# Patient Record
Sex: FEMALE | Race: BLACK OR AFRICAN AMERICAN | NOT HISPANIC OR LATINO | Employment: FULL TIME | URBAN - METROPOLITAN AREA
[De-identification: names, ages, dates, MRNs, and addresses within clinical notes are randomized per-mention and may not be internally consistent; named-entity substitution may affect disease eponyms.]

---

## 2018-12-20 ENCOUNTER — HOSPITAL ENCOUNTER (INPATIENT)
Facility: HOSPITAL | Age: 48
LOS: 2 days | Discharge: HOME | DRG: 066 | End: 2018-12-22
Attending: EMERGENCY MEDICINE | Admitting: PSYCHIATRY & NEUROLOGY
Payer: MEDICAID

## 2018-12-20 DIAGNOSIS — R47.81 SLURRED SPEECH: ICD-10-CM

## 2018-12-20 DIAGNOSIS — I63.511 ACUTE ISCHEMIC RIGHT MCA STROKE: ICD-10-CM

## 2018-12-20 DIAGNOSIS — I63.411 EMBOLIC STROKE INVOLVING RIGHT MIDDLE CEREBRAL ARTERY: Primary | ICD-10-CM

## 2018-12-20 DIAGNOSIS — I63.9 CEREBROVASCULAR ACCIDENT (CVA), UNSPECIFIED MECHANISM: ICD-10-CM

## 2018-12-20 LAB
ANION GAP SERPL CALC-SCNC: 9 MMOL/L
BASOPHILS # BLD AUTO: 0.04 K/UL
BASOPHILS NFR BLD: 0.4 %
BUN SERPL-MCNC: 23 MG/DL
CALCIUM SERPL-MCNC: 9.8 MG/DL
CHLORIDE SERPL-SCNC: 106 MMOL/L
CO2 SERPL-SCNC: 25 MMOL/L
CREAT SERPL-MCNC: 1 MG/DL
DIFFERENTIAL METHOD: NORMAL
EOSINOPHIL # BLD AUTO: 0.1 K/UL
EOSINOPHIL NFR BLD: 0.6 %
ERYTHROCYTE [DISTWIDTH] IN BLOOD BY AUTOMATED COUNT: 12.6 %
EST. GFR  (AFRICAN AMERICAN): >60 ML/MIN/1.73 M^2
EST. GFR  (NON AFRICAN AMERICAN): >60 ML/MIN/1.73 M^2
GLUCOSE SERPL-MCNC: 102 MG/DL
HCT VFR BLD AUTO: 41 %
HGB BLD-MCNC: 13.5 G/DL
IMM GRANULOCYTES # BLD AUTO: 0.03 K/UL
IMM GRANULOCYTES NFR BLD AUTO: 0.3 %
INR PPP: 3.6
LYMPHOCYTES # BLD AUTO: 4 K/UL
LYMPHOCYTES NFR BLD: 40.7 %
MCH RBC QN AUTO: 30.1 PG
MCHC RBC AUTO-ENTMCNC: 32.9 G/DL
MCV RBC AUTO: 92 FL
MONOCYTES # BLD AUTO: 0.8 K/UL
MONOCYTES NFR BLD: 8.2 %
NEUTROPHILS # BLD AUTO: 4.9 K/UL
NEUTROPHILS NFR BLD: 49.8 %
NRBC BLD-RTO: 0 /100 WBC
PLATELET # BLD AUTO: 340 K/UL
PMV BLD AUTO: 10.6 FL
POCT GLUCOSE: 94 MG/DL (ref 70–110)
POTASSIUM SERPL-SCNC: 4 MMOL/L
PROTHROMBIN TIME: 34.8 SEC
RBC # BLD AUTO: 4.48 M/UL
SODIUM SERPL-SCNC: 140 MMOL/L
WBC # BLD AUTO: 9.79 K/UL

## 2018-12-20 PROCEDURE — 82962 GLUCOSE BLOOD TEST: CPT

## 2018-12-20 PROCEDURE — 80061 LIPID PANEL: CPT

## 2018-12-20 PROCEDURE — 12000002 HC ACUTE/MED SURGE SEMI-PRIVATE ROOM

## 2018-12-20 PROCEDURE — 99285 EMERGENCY DEPT VISIT HI MDM: CPT

## 2018-12-20 PROCEDURE — 80048 BASIC METABOLIC PNL TOTAL CA: CPT

## 2018-12-20 PROCEDURE — 99284 EMERGENCY DEPT VISIT MOD MDM: CPT | Mod: ,,, | Performed by: EMERGENCY MEDICINE

## 2018-12-20 PROCEDURE — 85610 PROTHROMBIN TIME: CPT

## 2018-12-20 PROCEDURE — 83036 HEMOGLOBIN GLYCOSYLATED A1C: CPT

## 2018-12-20 PROCEDURE — 85025 COMPLETE CBC W/AUTO DIFF WBC: CPT

## 2018-12-20 PROCEDURE — 84443 ASSAY THYROID STIM HORMONE: CPT

## 2018-12-20 RX ORDER — AMLODIPINE BESYLATE 10 MG/1
10 TABLET ORAL DAILY
COMMUNITY

## 2018-12-20 RX ORDER — HYDROCHLOROTHIAZIDE 12.5 MG/1
12.5 TABLET ORAL DAILY
Status: ON HOLD | COMMUNITY
End: 2018-12-22 | Stop reason: HOSPADM

## 2018-12-21 PROBLEM — R47.1 DYSARTHRIA DUE TO ACUTE STROKE: Status: ACTIVE | Noted: 2018-12-21

## 2018-12-21 PROBLEM — I63.9 CEREBROVASCULAR ACCIDENT (CVA): Status: ACTIVE | Noted: 2018-12-21

## 2018-12-21 PROBLEM — I63.9 DYSARTHRIA DUE TO ACUTE STROKE: Status: ACTIVE | Noted: 2018-12-21

## 2018-12-21 PROBLEM — R29.810 FACIAL DROOP DUE TO ACUTE STROKE: Status: ACTIVE | Noted: 2018-12-21

## 2018-12-21 PROBLEM — I63.411 EMBOLIC STROKE INVOLVING RIGHT MIDDLE CEREBRAL ARTERY: Status: ACTIVE | Noted: 2018-12-21

## 2018-12-21 PROBLEM — I10 HYPERTENSION: Status: ACTIVE | Noted: 2018-12-21

## 2018-12-21 PROBLEM — I63.9 FACIAL DROOP DUE TO ACUTE STROKE: Status: ACTIVE | Noted: 2018-12-21

## 2018-12-21 LAB
AMPHET+METHAMPHET UR QL: NEGATIVE
APTT BLDCRRT: 24.6 SEC
ASCENDING AORTA: 2.88 CM
AV INDEX (PROSTH): 0.69
AV MEAN GRADIENT: 2.19 MMHG
AV PEAK GRADIENT: 4.67 MMHG
AV VALVE AREA: 2.92 CM2
B-HCG UR QL: NEGATIVE
BACTERIA #/AREA URNS AUTO: ABNORMAL /HPF
BARBITURATES UR QL SCN>200 NG/ML: NEGATIVE
BENZODIAZ UR QL SCN>200 NG/ML: NEGATIVE
BILIRUB UR QL STRIP: NEGATIVE
BSA FOR ECHO PROCEDURE: 2.15 M2
BZE UR QL SCN: NEGATIVE
CANNABINOIDS UR QL SCN: NEGATIVE
CHOLEST SERPL-MCNC: 293 MG/DL
CHOLEST/HDLC SERPL: 5.9 {RATIO}
CK MB SERPL-MCNC: 3.1 NG/ML
CK MB SERPL-RTO: 1.1 %
CK SERPL-CCNC: 285 U/L
CLARITY UR REFRACT.AUTO: ABNORMAL
COLOR UR AUTO: YELLOW
CREAT UR-MCNC: 328 MG/DL
CV ECHO LV RWT: 0.67 CM
DOP CALC AO PEAK VEL: 1.08 M/S
DOP CALC AO VTI: 16.16 CM
DOP CALC LVOT AREA: 4.23 CM2
DOP CALC LVOT DIAMETER: 2.32 CM
DOP CALC LVOT STROKE VOLUME: 47.11 CM3
DOP CALCLVOT PEAK VEL VTI: 11.15 CM
E WAVE DECELERATION TIME: 174.61 MSEC
E/A RATIO: 1.4
E/E' RATIO: 11.38
ECHO LV POSTERIOR WALL: 1.34 CM (ref 0.6–1.1)
ERYTHROCYTE [SEDIMENTATION RATE] IN BLOOD BY WESTERGREN METHOD: 53 MM/HR
ESTIMATED AVG GLUCOSE: 111 MG/DL
ETHANOL UR-MCNC: <10 MG/DL
FRACTIONAL SHORTENING: 27 % (ref 28–44)
GLUCOSE UR QL STRIP: NEGATIVE
HBA1C MFR BLD HPLC: 5.5 %
HDLC SERPL-MCNC: 50 MG/DL
HDLC SERPL: 17.1 %
HGB UR QL STRIP: NEGATIVE
HYALINE CASTS UR QL AUTO: 1 /LPF
INR PPP: 1.1
INTERVENTRICULAR SEPTUM: 1.5 CM (ref 0.6–1.1)
KETONES UR QL STRIP: ABNORMAL
LA MAJOR: 4.81 CM
LA MINOR: 5.02 CM
LA WIDTH: 3.08 CM
LDLC SERPL CALC-MCNC: 224.8 MG/DL
LEFT ATRIUM SIZE: 2.98 CM
LEFT ATRIUM VOLUME INDEX: 18.5 ML/M2
LEFT ATRIUM VOLUME: 38.33 CM3
LEFT INTERNAL DIMENSION IN SYSTOLE: 2.94 CM (ref 2.1–4)
LEFT VENTRICLE DIASTOLIC VOLUME INDEX: 34.05 ML/M2
LEFT VENTRICLE DIASTOLIC VOLUME: 70.64 ML
LEFT VENTRICLE MASS INDEX: 103.7 G/M2
LEFT VENTRICLE SYSTOLIC VOLUME INDEX: 16 ML/M2
LEFT VENTRICLE SYSTOLIC VOLUME: 33.28 ML
LEFT VENTRICULAR INTERNAL DIMENSION IN DIASTOLE: 4.02 CM (ref 3.5–6)
LEFT VENTRICULAR MASS: 215.14 G
LEUKOCYTE ESTERASE UR QL STRIP: ABNORMAL
LV LATERAL E/E' RATIO: 9.25
LV SEPTAL E/E' RATIO: 14.8
METHADONE UR QL SCN>300 NG/ML: NEGATIVE
MICROSCOPIC COMMENT: ABNORMAL
MV PEAK A VEL: 0.53 M/S
MV PEAK E VEL: 0.74 M/S
NITRITE UR QL STRIP: NEGATIVE
NON-SQ EPI CELLS #/AREA URNS AUTO: <1 /HPF
NONHDLC SERPL-MCNC: 243 MG/DL
OPIATES UR QL SCN: NEGATIVE
PCP UR QL SCN>25 NG/ML: NEGATIVE
PH UR STRIP: 5 [PH] (ref 5–8)
PISA TR MAX VEL: 2.33 M/S
POCT GLUCOSE: 91 MG/DL (ref 70–110)
PROT UR QL STRIP: NEGATIVE
PROTHROMBIN TIME: 11 SEC
PULM VEIN S/D RATIO: 1.36
PV PEAK D VEL: 0.45 M/S
PV PEAK S VEL: 0.61 M/S
RA MAJOR: 4.25 CM
RA WIDTH: 4.24 CM
RBC #/AREA URNS AUTO: 1 /HPF (ref 0–4)
RIGHT VENTRICULAR END-DIASTOLIC DIMENSION: 3.14 CM
RV TISSUE DOPPLER FREE WALL SYSTOLIC VELOCITY 1 (APICAL 4 CHAMBER VIEW): 14.67 M/S
SINUS: 3.47 CM
SP GR UR STRIP: 1.03 (ref 1–1.03)
SQUAMOUS #/AREA URNS AUTO: 11 /HPF
STJ: 2.85 CM
TDI LATERAL: 0.08
TDI SEPTAL: 0.05
TDI: 0.07
TOXICOLOGY INFORMATION: ABNORMAL
TR MAX PG: 21.72 MMHG
TRICUSPID ANNULAR PLANE SYSTOLIC EXCURSION: 1.88 CM
TRIGL SERPL-MCNC: 91 MG/DL
TROPONIN I SERPL DL<=0.01 NG/ML-MCNC: <0.006 NG/ML
TSH SERPL DL<=0.005 MIU/L-ACNC: 1.4 UIU/ML
URN SPEC COLLECT METH UR: ABNORMAL
WBC #/AREA URNS AUTO: 5 /HPF (ref 0–5)

## 2018-12-21 PROCEDURE — 81001 URINALYSIS AUTO W/SCOPE: CPT

## 2018-12-21 PROCEDURE — 92610 EVALUATE SWALLOWING FUNCTION: CPT

## 2018-12-21 PROCEDURE — 82550 ASSAY OF CK (CPK): CPT

## 2018-12-21 PROCEDURE — 25000003 PHARM REV CODE 250: Performed by: PHYSICIAN ASSISTANT

## 2018-12-21 PROCEDURE — 85652 RBC SED RATE AUTOMATED: CPT

## 2018-12-21 PROCEDURE — 36415 COLL VENOUS BLD VENIPUNCTURE: CPT

## 2018-12-21 PROCEDURE — 85730 THROMBOPLASTIN TIME PARTIAL: CPT

## 2018-12-21 PROCEDURE — 82553 CREATINE MB FRACTION: CPT

## 2018-12-21 PROCEDURE — 63600175 PHARM REV CODE 636 W HCPCS: Performed by: NURSE PRACTITIONER

## 2018-12-21 PROCEDURE — 80307 DRUG TEST PRSMV CHEM ANLYZR: CPT

## 2018-12-21 PROCEDURE — 92523 SPEECH SOUND LANG COMPREHEN: CPT

## 2018-12-21 PROCEDURE — 84484 ASSAY OF TROPONIN QUANT: CPT

## 2018-12-21 PROCEDURE — 97535 SELF CARE MNGMENT TRAINING: CPT

## 2018-12-21 PROCEDURE — 87040 BLOOD CULTURE FOR BACTERIA: CPT

## 2018-12-21 PROCEDURE — 99223 1ST HOSP IP/OBS HIGH 75: CPT | Mod: ,,, | Performed by: PSYCHIATRY & NEUROLOGY

## 2018-12-21 PROCEDURE — G8999 MOTOR SPEECH CURRENT STATUS: HCPCS | Mod: CJ

## 2018-12-21 PROCEDURE — 97165 OT EVAL LOW COMPLEX 30 MIN: CPT

## 2018-12-21 PROCEDURE — 20600001 HC STEP DOWN PRIVATE ROOM

## 2018-12-21 PROCEDURE — A4216 STERILE WATER/SALINE, 10 ML: HCPCS | Performed by: NURSE PRACTITIONER

## 2018-12-21 PROCEDURE — G9186 MOTOR SPEECH GOAL STATUS: HCPCS | Mod: CI

## 2018-12-21 PROCEDURE — 25000003 PHARM REV CODE 250: Performed by: NURSE PRACTITIONER

## 2018-12-21 PROCEDURE — 85610 PROTHROMBIN TIME: CPT

## 2018-12-21 PROCEDURE — 81025 URINE PREGNANCY TEST: CPT

## 2018-12-21 RX ORDER — ATORVASTATIN CALCIUM 20 MG/1
40 TABLET, FILM COATED ORAL ONCE
Status: COMPLETED | OUTPATIENT
Start: 2018-12-21 | End: 2018-12-21

## 2018-12-21 RX ORDER — ONDANSETRON 8 MG/1
8 TABLET, ORALLY DISINTEGRATING ORAL EVERY 8 HOURS PRN
Status: DISCONTINUED | OUTPATIENT
Start: 2018-12-21 | End: 2018-12-22 | Stop reason: HOSPADM

## 2018-12-21 RX ORDER — ATORVASTATIN CALCIUM 20 MG/1
40 TABLET, FILM COATED ORAL DAILY
Status: DISCONTINUED | OUTPATIENT
Start: 2018-12-21 | End: 2018-12-21

## 2018-12-21 RX ORDER — ATORVASTATIN CALCIUM 20 MG/1
80 TABLET, FILM COATED ORAL DAILY
Status: DISCONTINUED | OUTPATIENT
Start: 2018-12-22 | End: 2018-12-22 | Stop reason: HOSPADM

## 2018-12-21 RX ORDER — POLYETHYLENE GLYCOL 3350 17 G/17G
17 POWDER, FOR SOLUTION ORAL DAILY PRN
Status: DISCONTINUED | OUTPATIENT
Start: 2018-12-21 | End: 2018-12-22 | Stop reason: HOSPADM

## 2018-12-21 RX ORDER — SODIUM CHLORIDE 0.9 % (FLUSH) 0.9 %
3 SYRINGE (ML) INJECTION EVERY 8 HOURS
Status: DISCONTINUED | OUTPATIENT
Start: 2018-12-21 | End: 2018-12-22 | Stop reason: HOSPADM

## 2018-12-21 RX ORDER — AMLODIPINE BESYLATE 10 MG/1
10 TABLET ORAL DAILY
Status: DISCONTINUED | OUTPATIENT
Start: 2018-12-21 | End: 2018-12-22 | Stop reason: HOSPADM

## 2018-12-21 RX ORDER — CLOPIDOGREL BISULFATE 75 MG/1
75 TABLET ORAL DAILY
Status: DISCONTINUED | OUTPATIENT
Start: 2018-12-21 | End: 2018-12-22 | Stop reason: HOSPADM

## 2018-12-21 RX ORDER — ACETAMINOPHEN 325 MG/1
650 TABLET ORAL EVERY 6 HOURS PRN
Status: DISCONTINUED | OUTPATIENT
Start: 2018-12-21 | End: 2018-12-22 | Stop reason: HOSPADM

## 2018-12-21 RX ORDER — ASPIRIN 81 MG/1
81 TABLET ORAL DAILY
Status: DISCONTINUED | OUTPATIENT
Start: 2018-12-21 | End: 2018-12-22 | Stop reason: HOSPADM

## 2018-12-21 RX ORDER — HEPARIN SODIUM 5000 [USP'U]/ML
5000 INJECTION, SOLUTION INTRAVENOUS; SUBCUTANEOUS EVERY 8 HOURS
Status: DISCONTINUED | OUTPATIENT
Start: 2018-12-21 | End: 2018-12-22 | Stop reason: HOSPADM

## 2018-12-21 RX ADMIN — Medication 3 ML: at 03:12

## 2018-12-21 RX ADMIN — AMLODIPINE BESYLATE 10 MG: 10 TABLET ORAL at 08:12

## 2018-12-21 RX ADMIN — HEPARIN SODIUM 5000 UNITS: 5000 INJECTION, SOLUTION INTRAVENOUS; SUBCUTANEOUS at 09:12

## 2018-12-21 RX ADMIN — ATORVASTATIN CALCIUM 40 MG: 20 TABLET, FILM COATED ORAL at 08:12

## 2018-12-21 RX ADMIN — Medication 3 ML: at 10:12

## 2018-12-21 RX ADMIN — Medication 3 ML: at 06:12

## 2018-12-21 RX ADMIN — ATORVASTATIN CALCIUM 40 MG: 20 TABLET, FILM COATED ORAL at 01:12

## 2018-12-21 RX ADMIN — HEPARIN SODIUM 5000 UNITS: 5000 INJECTION, SOLUTION INTRAVENOUS; SUBCUTANEOUS at 05:12

## 2018-12-21 RX ADMIN — CLOPIDOGREL 75 MG: 75 TABLET, FILM COATED ORAL at 01:12

## 2018-12-21 RX ADMIN — ASPIRIN 81 MG: 81 TABLET, COATED ORAL at 08:12

## 2018-12-21 RX ADMIN — HEPARIN SODIUM 5000 UNITS: 5000 INJECTION, SOLUTION INTRAVENOUS; SUBCUTANEOUS at 01:12

## 2018-12-21 NOTE — PT/OT/SLP EVAL
Occupational Therapy   Evaluation and Discharge Note    Name: Lor Ryan  MRN: [id number]  Admitting Diagnosis:  Cerebrovascular accident (CVA)      Recommendations:     Discharge Recommendations: (home/no OT needs)  Discharge Equipment Recommendations:  none  Barriers to discharge:  None    History:     Occupational Profile:  Living Environment: Pt lives alone in 2nd floor apartment with no elevator access. 10 stairs with railing<>landing<>10 stairs with railing to enter. Tub/shower combo.  Previous level of function: active and indep. Driving. 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 Reading glasses.   Roles and Routines: mother, grandmother, sister, home and community dweller, friend, employee,   Equipment Used at home:  none  Assistance upon Discharge: sister and son live on same street    Past Medical History:   Diagnosis Date    Hypertension        History reviewed. No pertinent surgical history.    Subjective     Chief Complaint: none reported  Patient/Family Comments/goals: home soon    Pain/Comfort:  · Pain Rating 1: 0/10  · Pain Rating Post-Intervention 1: 0/10    Patients cultural, spiritual, Rastafari conflicts given the current situation: no    Objective:     Communicated with: RN prior to session.  Patient found HOB elevated with bed alarm, telemetry upon OT entry to room.    General Precautions: Standard, aspiration, fall   Orthopedic Precautions:N/A   Braces: N/A     Occupational Performance:    Bed Mobility:    · Patient completed Rolling/Turning to Right with modified independence and with side rail  · Patient completed Supine to Sit with modified independence and with side rail    Functional Mobility/Transfers:  · Patient completed Sit <> Stand Transfer with modified independence  with  no assistive device   · Patient completed Bed <> Chair Transfer using Step Transfer technique with modified independence with no assistive device  · Patient completed   Shower Transfer Step Transfer technique with modified independence with no AD (threshold shower)  · Functional Mobility: household distances x3 trials with mod(I) and no AD  · Dynamic task for adjusting sock in standing with L UE support on wall with mod(I) and no LOB    Activities of Daily Living:  · Feeding:  modified independence with set up  · Grooming: modified independence standing at sink for oral care, washing face, washing hands  · Upper Body Dressing: modified independence donning gown as jacket- completed bilateral tie closure  · Lower Body Dressing: modified independence B socks in 4 point position EOB    Cognitive/Visual Perceptual:  Cognitive/Psychosocial Skills:     -       Oriented to: Person, Place, Time and Situation   -       Follows Commands/attention:Follows multistep  commands  -       Communication: clear/fluent  -       Memory: No Deficits noted  -       Safety awareness/insight to disability: intact   -       Mood/Affect/Coping skills/emotional control: Appropriate to situation  Visual/Perceptual:      -Intact  tracking functionally    Physical Exam:  Balance:    -       WFL; no LOB within session  Postural examination/scapula alignment:    -       Rounded shoulders  -       Forward head  -       Posterior pelvic tilt  Skin integrity: Visible skin intact  Edema:  None noted  Sensation:    -       Intact  B UE  Motor Planning:    -       Intact B UE  Dominant hand:    -       R  Upper Extremity Range of Motion:     -       Right Upper Extremity: WFL  -       Left Upper Extremity: WFL  Upper Extremity Strength:    -       Right Upper Extremity: WFL  -       Left Upper Extremity: WFL   Strength:    -       Right Upper Extremity: WFL  -       Left Upper Extremity: WFL  Fine Motor Coordination:    -       Intact  Left hand, finger to nose, Right hand, finger to nose, Left hand thumb/finger opposition skills, Right hand thumb/finger opposition skills, Left hand, manipulation of objects and  "Right hand, manipulation of objects  Gross motor coordination:   WFL B UE    AMPAC 6 Click ADL:  AMPAC Total Score: 24     Body mass index is 35.2 kg/m².    Vitals:    18 0844   BP: 118/69   Pulse: 84   Resp: 16   Temp: 96.2 °F (35.7 °C)     Treatment & Education:  -Pt alert and agreeable to therapy session; edu on OT role in care  -OT to orient pt to [place] Booklet at bedside-- Discussed s/s of stroke using FAST acronym with verbalized understanding   -Communication board updated; questions/concerns addressed within OT scope of practice  -no family at bedside for education   Education:    Patient left up in chair with all lines intact, call button in reach, chair alarm on and RN notified    Assessment:     Lor Ryan is a 48 y.o. female with a medical diagnosis of Cerebrovascular accident (CVA). At this time, patient is functioning at their prior level of function for ADLs/self care/mobility at household distances and does not require further acute or post acute OT services at this time. She is safe to be up with nursing supervision for completion of ADLs/self care/mobility at this time.    Clinical Decision Makin.  OT Low:  "Pt evaluation falls under low complexity for evaluation coding due to performance deficits noted in 1-3 areas as stated above and 0 co-morbities affecting current functional status. Data obtained from problem focused assessments. No modifications or assistance was required for completion of evaluation. Only brief occupational profile and history review completed."     Plan:     During this hospitalization, patient does not require further acute OT services.  Please re-consult if situation changes.    · Plan of Care Reviewed with: patient    This Plan of care has been discussed with the patient who was involved in its development and understands and is in agreement with the identified goals and treatment plan    Time Tracking:     OT Date of Treatment: 18  OT Start " Time: 0802  OT Stop Time: 0834  OT Total Time (min): 32 min    Billable Minutes:Evaluation 20  Self Care/Home Management 12    QUINCY Andrade  12/21/2018

## 2018-12-21 NOTE — PLAN OF CARE
Problem: SLP Goal  Goal: SLP Goal  Speech-Language Pathology Goals  Goals to be met by 12/28/18  1. Patient will tolerate REGULAR DIET/THIN LIQUIDS with no s/s of aspiration.  2. Patient will independently recall and utilize three clear speech strategies per session.  3. Patient will participate in dysarthria exercises x10 per session with min assist from SLP.  4. Patient with participate in ongoing assessment in order to determine best POC.    Patient seen for swallow eval and speech/language/cognitive assessment. SLP recommending REGULAR DIET/THIN LIQUIDS at this time.     Kayode Duran CF-SLP  Speech-Language Pathology  Pager: [phone]

## 2018-12-21 NOTE — PLAN OF CARE
Problem: Adult Inpatient Plan of Care  Goal: Plan of Care Review  Outcome: Ongoing (interventions implemented as appropriate)  Plan of care reviewed with pt. Pt voiced understanding. Pt AAOx4. Pt denies any c/o during the shift. No apparent distress noted. Pt ambulated in room with steady gait. VSS.  Stroke booklet given to pt.  Fall precautions maintained. Bed in lowest position, and locked. Call light within reach and advised to call for assistance. Side rails x 2 and slip resistant socks on at this time. Will continue to monitor.

## 2018-12-21 NOTE — PT/OT/SLP DISCHARGE
Physical Therapy Discharge Summary    Name: Lor Ryan  MRN: [id number]   Principal Problem: Cerebrovascular accident (CVA)     Patient Discharged from acute Physical Therapy on 12/21/2018.       Assessment:     PT spoke with patient at bedside, RN and OT.  The patient reports no difficulty with walking, no change in balance and no change in mobility since prior to admit.  The RN and OT confirmed that she is ambulating independently without LOB and without safety concerns. PT evaluation not indicated at this time. She appears safe to return home with no additional skilled PT services. She is safe to ambulate with nursing.     Plan:     No PT needs at this time. Please refer to OT and SLP evaluations for any additional recommendations.     Sunni Pierce, PT  12/21/2018

## 2018-12-21 NOTE — H&P
[facility]  Vascular Neurology  Comprehensive Stroke Center  History & Physical    Inpatient consult to Vascular (Stroke) Neurology  Consult performed by: Kimberley Kam NP  Consult ordered by: Brandy Yanes DO  Reason for consult: stroke        Assessment/Plan:     Patient is a 48 y.o. year old female with:    * Cerebrovascular accident (CVA)    47 y/o female with facial droop and dysarthria with infarct right insula    Antithrombotics: ASA 81 mg daily    Statins: Lipitor 40 mg daily    Aggressive risk factor modification: HTN     Rehab efforts: PT/OT/SLP to evaluate and treat    Diagnostics ordered/pending: HgbA1C to assess blood glucose levels, Lipid Profile to assess cholesterol levels, MRA head to assess vasculature, MRA neck/arch to assess vasculature, MRI head without contrast to assess brain parenchyma, TTE to assess cardiac function/status , TSH to assess thyroid function    VTE prophylaxis: Heparin 5000 units SQ every 8 hours    BP parameters: Infarct: No intervention, SBP <220         Hypertension    Stroke risk factor  SBP <220     Facial droop due to acute stroke    Due to stroke  Aggressive therapy         Dysarthria due to acute stroke    Due to stroke  Aggressive therapy             STROKE DOCUMENTATION          NIH Scale:  1a. Level of Consciousness: 0-->Alert, keenly responsive  1b. LOC Questions: 0-->Answers both questions correctly  1c. LOC Commands: 0-->Performs both tasks correctly  2. Best Gaze: 0-->Normal  3. Visual: 0-->No visual loss  4. Facial Palsy: 1-->Minor paralysis (flattened nasolabial fold, asymmetry on smiling)  5a. Motor Arm, Left: 0-->No drift, limb holds 90 (or 45) degrees for full 10 secs  5b. Motor Arm, Right: 0-->No drift, limb holds 90 (or 45) degrees for full 10 secs  6a. Motor Leg, Left: 0-->No drift, leg holds 30 degree position for full 5 secs  6b. Motor Leg, Right: 0-->No drift, leg holds 30 degree position for full 5 secs  7. Limb  Ataxia: 0-->Absent  8. Sensory: 0-->Normal, no sensory loss  9. Best Language: 0-->No aphasia, normal  10. Dysarthria: 1-->Mild-to-moderate dysarthria, patient slurs at least some words and, at worst, can be understood with some difficulty  11. Extinction and Inattention (formerly Neglect): 0-->No abnormality  Total (NIH Stroke Scale): 2     Modified [place] Score: 0  Lilly Coma Scale:15   ABCD2 Score:    [id number] Score:   HAS -BLED Score:   ICH Score:   Hunt & Rubio Classification:      Thrombolysis Candidate? No, Out of window       Interventional Revascularization Candidate?   Is the patient eligible for mechanical endovascular reperfusion (ALEKSANDR)?  No; No significant neurological deficit    Hemorrhagic change of an Ischemic Stroke: Does this patient have an ischemic stroke with hemorrhagic changes? No         Subjective:     History of Present Illness:  49 y/o female who was noted to have facial droop on left and dysarthria starting on Tuesday she was finally made to come to the ED on 12-20-18 by her mother because she thought she might be having a stroke.   Patient with histroy of HTN and compliant with meds.   CT scan shows acute infarct in the right insular cortex          Past Medical History:   Diagnosis Date    Hypertension      History reviewed. No pertinent surgical history.  History reviewed. No pertinent family history.  Social History     Tobacco Use    Smoking status: Never Smoker    Smokeless tobacco: Never Used   Substance Use Topics    Alcohol use: No     Frequency: Never    Drug use: No     Review of patient's allergies indicates:  No Known Allergies    Medications: I have reviewed the current medication administration record.      (Not in a hospital admission)    Review of Systems   Constitutional: Negative for chills and fever.   HENT: Negative for ear discharge and ear pain.    Eyes: Negative for pain and redness.   Respiratory: Negative for cough and shortness of breath.     Cardiovascular: Negative for chest pain and leg swelling.   Gastrointestinal: Negative for abdominal distention and abdominal pain.   Genitourinary: Negative for difficulty urinating and dysuria.   Musculoskeletal: Negative for arthralgias and back pain.   Skin: Negative for rash and wound.   Neurological: Positive for facial asymmetry and speech difficulty.     Objective:     Vital Signs (Most Recent):  Temp: 99 °F (37.2 °C) (12/20/18 1925)  Pulse: 72 (12/21/18 0138)  Resp: 18 (12/21/18 0103)  BP: 139/75 (12/21/18 0301)  SpO2: 96 % (12/21/18 0301)    Vital Signs Range (Last 24H):  Temp:  [99 °F (37.2 °C)]   Pulse:  []   Resp:  [16-28]   BP: (120-162)/(68-93)   SpO2:  [96 %-99 %]     Physical Exam   Constitutional: She is oriented to person, place, and time. She appears well-developed and well-nourished.   HENT:   Head: Normocephalic and atraumatic.   Eyes: EOM are normal. Pupils are equal, round, and reactive to light.   Neck: Normal range of motion.   Cardiovascular: Normal rate.   Pulmonary/Chest: Effort normal.   Abdominal: Soft.   Neurological: She is alert and oriented to person, place, and time.   Facial droop  Dysarthria     Skin: Skin is warm and dry.   Nursing note and vitals reviewed.      Neurological Exam:   LOC: alert  Attention Span: Good   Language: No aphasia  Articulation: Dysarthria  Orientation: Person, Place, Time   Visual Fields: Full  EOM (CN III, IV, VI): Full/intact  Pupils (CN II, III): PERRL  Facial Sensation (CN V): Normal  Facial Movement (CN VII): Lower facial weakness on the Left  Gag Reflex: present  Reflexes: flexor plantar responses bilaterally  Motor: Arm left  Normal 5/5  Leg left  Normal 5/5  Arm right  Normal 5/5  Leg right Normal 5/5  Cebellar: No evidence of appendicular or axial ataxia  Sensation: Intact to light touch, temperature and vibration  Tone: Normal tone throughout      Laboratory:  CMP:   Recent Labs   Lab 12/20/18  2301   CALCIUM 9.8      K 4.0   CO2  25      BUN 23*   CREATININE 1.0     CBC:   Recent Labs   Lab 12/20/18  2159   WBC 9.79   RBC 4.48   HGB 13.5   HCT 41.0      MCV 92   MCH 30.1   MCHC 32.9     Lipid Panel: No results for input(s): CHOL, LDLCALC, HDL, TRIG in the last 168 hours.  Coagulation:   Recent Labs   Lab 12/20/18  2159   INR 3.6*     Hgb A1C: No results for input(s): HGBA1C in the last 168 hours.  TSH: No results for input(s): TSH in the last 168 hours.    Diagnostic Results:      Brain imaging:  CT head w/o contrast 12-21-18 results:  Acute infarction of the right insular cortex.  No significant mass effect or midline shift.  No evidence of hemorrhagic conversion    Vessel Imaging:      Cardiac Evaluation:           Kimberley Kam NP  [facility]  Department of Vascular Neurology   [facility]

## 2018-12-21 NOTE — ED NOTES
"Pt w/ hx of HTN presents to ED via EMS complaining of aphasia and left sided facial droop since Tuesday. Pt reports that Tuesday she discovered that "she wasn't talking right and my mouth was drooping." Pt denies unsteady gait, blurred vision or h/a upon arrival to ED 11 or since Tuesday. Pt also denies hx of stroke or TIA. Pt states that she is compliant to antihypertensives and denies any other pertinent medical hx. Pt is also AAO x3 upon arrival.   "

## 2018-12-21 NOTE — PLAN OF CARE
CM met with patient in reference to discharge planning. Patient lives alone in an apartment on the 2nd floor. Prior to admission, patient was independent with ADLs.  Patient does not have a PCP due to no insurance coverage. CM will arrange appt at [facility] at discharge.        12/21/18 1050   Discharge Assessment   Assessment Type Discharge Planning Assessment   Confirmed/corrected address and phone number on facesheet? Yes   Assessment information obtained from? Patient   Expected Length of Stay (days) (TBD)   Communicated expected length of stay with patient/caregiver yes   Prior to hospitilization cognitive status: Alert/Oriented;No Deficits   Prior to hospitalization functional status: Independent   Current cognitive status: Alert/Oriented;No Deficits   Current Functional Status: Independent   Lives With alone   Able to Return to Prior Arrangements yes   Is patient able to care for self after discharge? Unable to determine at this time (comments)   Patient's perception of discharge disposition home or selfcare   Readmission Within the Last 30 Days no previous admission in last 30 days   Patient currently being followed by outpatient case management? No   Patient currently receives any other outside agency services? No   Equipment Currently Used at Home none   Do you have any problems affording any of your prescribed medications? No   Is the patient taking medications as prescribed? yes   Does the patient have transportation home? Yes   Transportation Anticipated family or friend will provide   Does the patient receive services at the [facility]? No   Discharge Plan A Home   Discharge Plan B Home with family   Patient/Family in Agreement with Plan yes

## 2018-12-21 NOTE — ASSESSMENT & PLAN NOTE
49 y/o female with facial droop and dysarthria with infarct right insula    Antithrombotics: ASA 81 mg daily    Statins: Lipitor 40 mg daily    Aggressive risk factor modification: HTN     Rehab efforts: PT/OT/SLP to evaluate and treat    Diagnostics ordered/pending: HgbA1C to assess blood glucose levels, Lipid Profile to assess cholesterol levels, MRA head to assess vasculature, MRA neck/arch to assess vasculature, MRI head without contrast to assess brain parenchyma, TTE to assess cardiac function/status , TSH to assess thyroid function    VTE prophylaxis: Heparin 5000 units SQ every 8 hours    BP parameters: Infarct: No intervention, SBP <220

## 2018-12-21 NOTE — PT/OT/SLP EVAL
Speech Language Pathology Evaluation  Cognitive/Bedside Swallow    Patient Name:  Lor Ryan   MRN:  [id number]  Admitting Diagnosis: Cerebrovascular accident (CVA)    Recommendations:                  General Recommendations:  Speech/language therapy  Diet recommendations:  Regular, Thin   Aspiration Precautions: Standard aspiration precautions   General Precautions: Standard, fall  Communication strategies:  none    History:     Past Medical History:   Diagnosis Date    Hypertension        History reviewed. No pertinent surgical history.    Social History: Patient lives by herself in [facility].    Prior Intubation HX:  None this admission    Modified Barium Swallow: None on file    Chest X-Rays 12/28/18: The lungs appear symmetrically expanded without evidence of confluent airspace consolidation or pleural effusion.  There is no evidence of pneumothorax.    Prior diet: regular/thin.    Subjective     Patient alert and cooperative during session  Communicated with nurse prior to session     Pain/Comfort:  · Pain Rating 1: 0/10  · Pain Rating Post-Intervention 1: 0/10    Objective:     Cognitive Status:    WFL      Receptive Language:   Comprehension:      WFL    Pragmatics:    WFL    Expressive Language:  Verbal:    WFL    Motor Speech:  Dysarthria present across all utterances, but patient 100% intelligible during session regardless of length of utterance.    Voice:   WFL    Visual-Spatial:  WFL    Reading:   WFL     Written Expression:   WFL    Oral Musculature Evaluation  · Oral Musculature: facial asymmetry present  · Dentition: upper dentures, uses dentures to eat(dentures not in hospital)  · Secretion Management: adequate  · Mucosal Quality: good  · Mandibular Strength and Mobility: WFL  · Oral Labial Strength and Mobility: impaired retraction, impaired pursing  · Lingual Strength and Mobility: WFL  · Buccal Strength and Mobility: WFL  · Volitional Cough: WFL  · Volitional Swallow: timey; good laryngeal  "elevation  · Voice Prior to PO Intake: clear; good intensity    Bedside Swallow Eval:   Consistencies Assessed:  · Thin liquids x9 (via cup edge)  · Solids x6 (gerard crackers)   · **All consistencies self-fed by patient    Oral Phase:   · WFL    Pharyngeal Phase:   · no overt clinical signs/symptoms of aspiration  · no overt clinical signs/symptoms of pharyngeal dysphagia    Compensatory Strategies  · None    Treatment: Patient seen for bedside swallow eval and speech/language/cognitive assessment. She was sitting up in chair at bedside during session. Patient reported no difficulties with current diet or language/cognition, but she did state that her speech was "not how she usually sounds." Patient tolerated all consistencies without difficulty despite not wearing upper dentures. She appeared to be at baseline for cognitive-linguistics, but did present with dysarthria. SLP educated patient regarding clear speech strategies and ST's role in dysarthria therapy. She verbalized understanding and had no further questions. Whiteboard updated. Patient left in chair with call light within reach. ST will f/u for dysarthria tx.     Assessment:     Lor Ryan is a 48 y.o. female with an SLP diagnosis of Dysarthria.      Goals:   Multidisciplinary Problems     SLP Goals        Problem: SLP Goal    Goal Priority Disciplines Outcome   SLP Goal     SLP    Description:  Speech-Language Pathology Goals  Goals to be met by 12/28/18  1. Patient will tolerate REGULAR DIET/THIN LIQUIDS with no s/s of aspiration.  2. Patient will independently recall and utilize three clear speech strategies per session.  3. Patient will participate in dysarthria exercises x10 per session with min assist from SLP.  4. Patient with participate in ongoing assessment in order to determine best POC.                    Plan:     · Patient to be seen:  3 x/week   · Plan of Care expires:  01/20/19  · Plan of Care reviewed with:  patient   · SLP " Follow-Up:  Yes       Discharge recommendations:  Discharge Facility/Level of Care Needs: (OP or HH ST for dysarthria)   Barriers to Discharge:  None    Time Tracking:     SLP Treatment Date:   12/21/18  Speech Start Time:  0910  Speech Stop Time:  0939     Speech Total Time (min):  29 min    Billable Minutes: Eval 20  and Eval Swallow and Oral Function 9    ANDREW Reed-SLP  Speech-Language Pathology  Pager: [phone]   12/21/2018

## 2018-12-21 NOTE — ED PROVIDER NOTES
Encounter Date: 12/20/2018       History     Chief Complaint   Patient presents with    Aphasia     pt c/o slurred speech x 2 days ago - she normally wears dental bridge that is not present - pt's mother reports that her speech is not normal . denies any other complaints. hx of htn      HPI   49 yo F with a PMH of HTN presents with speech slurring and right sided facial droop. Patient's mom report she noticed the patient's symptoms around 6:30 pm this evening. She last saw her normal 4 days ago. She last heard her speak normal yesterday. Patient states she feels her speech is slurred for the past 3 days. No similar symptoms in the past.     Review of patient's allergies indicates:  Allergies not on file  Past Medical History:   Diagnosis Date    Hypertension      No past surgical history on file.  No family history on file.  Social History     Tobacco Use    Smoking status: Not on file   Substance Use Topics    Alcohol use: Not on file    Drug use: Not on file     Review of Systems   Constitutional: Negative for chills and fever.   HENT: Negative for drooling.    Eyes: Negative for visual disturbance.   Respiratory: Negative for shortness of breath.    Cardiovascular: Negative for chest pain.   Gastrointestinal: Negative for abdominal pain, blood in stool, nausea and vomiting.   Genitourinary: Negative for dysuria.   Musculoskeletal: Negative for back pain and neck pain.   Skin: Negative for pallor, rash and wound.   Neurological: Positive for facial asymmetry and speech difficulty. Negative for seizures, syncope, weakness, light-headedness, numbness and headaches.   Psychiatric/Behavioral: Negative for confusion.       Physical Exam     Initial Vitals   BP Pulse Resp Temp SpO2   12/20/18 1922 12/20/18 1922 12/20/18 1922 12/20/18 1925 12/20/18 1922   (!) 134/93 100 16 99 °F (37.2 °C) 97 %      MAP       --                Physical Exam    Nursing note and vitals reviewed.  Constitutional: She appears  well-developed and well-nourished. She is not diaphoretic. No distress.   HENT:   Head: Normocephalic and atraumatic.   Eyes: EOM are normal. Pupils are equal, round, and reactive to light. No scleral icterus.   Neck: Normal range of motion. Neck supple. No JVD present.   Cardiovascular: Normal rate, regular rhythm, normal heart sounds and intact distal pulses. Exam reveals no gallop and no friction rub.    No murmur heard.  Pulmonary/Chest: Breath sounds normal. No respiratory distress. She has no wheezes. She has no rhonchi. She has no rales. She exhibits no tenderness.   Abdominal: Soft. Bowel sounds are normal. She exhibits no distension and no mass. There is no tenderness. There is no rebound and no guarding.   Neurological: She is alert and oriented to person, place, and time. She has normal strength. She displays no atrophy and no tremor. No cranial nerve deficit or sensory deficit. She exhibits normal muscle tone. She displays a negative Romberg sign. She displays no seizure activity. Coordination normal. GCS score is 15. GCS eye subscore is 4. GCS verbal subscore is 5. GCS motor subscore is 6.   Skin: Skin is warm and dry. Capillary refill takes less than 2 seconds. No rash and no abscess noted. No erythema. No pallor.        HO-III MDM  This is an emergent evaluation of a 49 yo F with a PMH of HTN presents with speech slurring and right sided facial droop. AFVSS. Physical exam notable for non-toxic appearing female in no acute distress. NIHSS 1 for minor facial palsy. Remainder of exam unremarkable. DDx includes but is not limited to stroke, intracranial lesion vs malignancy, electrolyte abnormality, hypoglycemia. Will obtain labs and imaging including CT head, CBC, BMP, POC glucose. Will also provide pain medication as need. Will continue to monitor and frequently reassess pending results of labs, treatments and final disposition. Case discussed with Dr. Phan.       Brandy Yanes D.O  [facility]  EMERGENCY MEDICINE PGY-3  9:33 PM 12/20/2018    HO-III UPDATE  Notified by radiology that patient has acute infarct of the right insular cortex without midline shift or mass effect. Vascular neurology notified and will evaluate patient.     Brandy Yanes D.O  [facility] EMERGENCY MEDICINE PGY-3  11:35 PM 12/20/2018    HO-III UPDATE  Patient admitted to vascular neurology.   Brandy Yanes D.O  [facility] EMERGENCY MEDICINE PGY-3  2:13 AM 12/21/2018      ED Course   Procedures  Labs Reviewed - No data to display       Imaging Results    None             Additional MDM:     NIH Stroke Scale:   Interval = baseline (upon arrival/admit)  Level of consciousness = 0 - alert  LOC questions = 0 - answers both correctly  LOC commands = 0 - performs both correctly  Best gaze = 0 - normal  Visual = 0 - no visual loss  Facial palsy = 1 - minor  Motor left arm =  0 - no drift  Motor right arm =  0 - no drift  Motor left leg = 0 - no drift  Motor right leg =  0 - no drift  Limb ataxia = 0 - absent  Sensory = 0 - normal  Best language = 0 - no aphasia  Dysarthria = 0 - normal articulation  Extinction and inattention = 0 - no neglect  NIH Stroke Scale Total = 1                   Clinical Impression:   The primary encounter diagnosis was Cerebrovascular accident (CVA), unspecified mechanism. Diagnoses of Slurred speech and Acute ischemic right MCA stroke were also pertinent to this visit.                             Brandy Yanes,   Resident  12/21/18 1311

## 2018-12-21 NOTE — SUBJECTIVE & OBJECTIVE
Past Medical History:   Diagnosis Date    Hypertension      History reviewed. No pertinent surgical history.  History reviewed. No pertinent family history.  Social History     Tobacco Use    Smoking status: Never Smoker    Smokeless tobacco: Never Used   Substance Use Topics    Alcohol use: No     Frequency: Never    Drug use: No     Review of patient's allergies indicates:  No Known Allergies    Medications: I have reviewed the current medication administration record.      (Not in a hospital admission)    Review of Systems   Constitutional: Negative for chills and fever.   HENT: Negative for ear discharge and ear pain.    Eyes: Negative for pain and redness.   Respiratory: Negative for cough and shortness of breath.    Cardiovascular: Negative for chest pain and leg swelling.   Gastrointestinal: Negative for abdominal distention and abdominal pain.   Genitourinary: Negative for difficulty urinating and dysuria.   Musculoskeletal: Negative for arthralgias and back pain.   Skin: Negative for rash and wound.   Neurological: Positive for facial asymmetry and speech difficulty.     Objective:     Vital Signs (Most Recent):  Temp: 99 °F (37.2 °C) (12/20/18 1925)  Pulse: 72 (12/21/18 0138)  Resp: 18 (12/21/18 0103)  BP: 139/75 (12/21/18 0301)  SpO2: 96 % (12/21/18 0301)    Vital Signs Range (Last 24H):  Temp:  [99 °F (37.2 °C)]   Pulse:  []   Resp:  [16-28]   BP: (120-162)/(68-93)   SpO2:  [96 %-99 %]     Physical Exam   Constitutional: She is oriented to person, place, and time. She appears well-developed and well-nourished.   HENT:   Head: Normocephalic and atraumatic.   Eyes: EOM are normal. Pupils are equal, round, and reactive to light.   Neck: Normal range of motion.   Cardiovascular: Normal rate.   Pulmonary/Chest: Effort normal.   Abdominal: Soft.   Neurological: She is alert and oriented to person, place, and time.   Facial droop  Dysarthria     Skin: Skin is warm and dry.   Nursing note and  vitals reviewed.      Neurological Exam:   LOC: alert  Attention Span: Good   Language: No aphasia  Articulation: Dysarthria  Orientation: Person, Place, Time   Visual Fields: Full  EOM (CN III, IV, VI): Full/intact  Pupils (CN II, III): PERRL  Facial Sensation (CN V): Normal  Facial Movement (CN VII): Lower facial weakness on the Left  Gag Reflex: present  Reflexes: flexor plantar responses bilaterally  Motor: Arm left  Normal 5/5  Leg left  Normal 5/5  Arm right  Normal 5/5  Leg right Normal 5/5  Cebellar: No evidence of appendicular or axial ataxia  Sensation: Intact to light touch, temperature and vibration  Tone: Normal tone throughout      Laboratory:  CMP:   Recent Labs   Lab 12/20/18  2301   CALCIUM 9.8      K 4.0   CO2 25      BUN 23*   CREATININE 1.0     CBC:   Recent Labs   Lab 12/20/18 2159   WBC 9.79   RBC 4.48   HGB 13.5   HCT 41.0      MCV 92   MCH 30.1   MCHC 32.9     Lipid Panel: No results for input(s): CHOL, LDLCALC, HDL, TRIG in the last 168 hours.  Coagulation:   Recent Labs   Lab 12/20/18 2159   INR 3.6*     Hgb A1C: No results for input(s): HGBA1C in the last 168 hours.  TSH: No results for input(s): TSH in the last 168 hours.    Diagnostic Results:      Brain imaging:  CT head w/o contrast 12-21-18 results:  Acute infarction of the right insular cortex.  No significant mass effect or midline shift.  No evidence of hemorrhagic conversion    Vessel Imaging:      Cardiac Evaluation:

## 2018-12-21 NOTE — NURSING
Pt transferred from ED to unit via stretcher. Ambulated to in room, to bathroom, then to bed without any problems observed. Gait steady. Call light in reach. Orientated to room and unit. Will cont to monitor.

## 2018-12-22 VITALS
WEIGHT: 218 LBS | SYSTOLIC BLOOD PRESSURE: 117 MMHG | RESPIRATION RATE: 18 BRPM | HEART RATE: 76 BPM | OXYGEN SATURATION: 94 % | DIASTOLIC BLOOD PRESSURE: 58 MMHG | HEIGHT: 66 IN | BODY MASS INDEX: 35.03 KG/M2 | TEMPERATURE: 98 F

## 2018-12-22 PROBLEM — E78.2 MIXED HYPERLIPIDEMIA: Status: ACTIVE | Noted: 2018-12-22

## 2018-12-22 LAB
ALBUMIN SERPL BCP-MCNC: 3.7 G/DL
ALP SERPL-CCNC: 78 U/L
ALT SERPL W/O P-5'-P-CCNC: 19 U/L
ANION GAP SERPL CALC-SCNC: 9 MMOL/L
AST SERPL-CCNC: 19 U/L
BASOPHILS # BLD AUTO: 0.05 K/UL
BASOPHILS NFR BLD: 0.8 %
BILIRUB SERPL-MCNC: 0.7 MG/DL
BUN SERPL-MCNC: 21 MG/DL
CALCIUM SERPL-MCNC: 9.7 MG/DL
CHLORIDE SERPL-SCNC: 106 MMOL/L
CO2 SERPL-SCNC: 26 MMOL/L
CREAT SERPL-MCNC: 1 MG/DL
DIFFERENTIAL METHOD: ABNORMAL
EOSINOPHIL # BLD AUTO: 0.1 K/UL
EOSINOPHIL NFR BLD: 2 %
ERYTHROCYTE [DISTWIDTH] IN BLOOD BY AUTOMATED COUNT: 12.3 %
EST. GFR  (AFRICAN AMERICAN): >60 ML/MIN/1.73 M^2
EST. GFR  (NON AFRICAN AMERICAN): >60 ML/MIN/1.73 M^2
GLUCOSE SERPL-MCNC: 95 MG/DL
HCT VFR BLD AUTO: 38.6 %
HGB BLD-MCNC: 12.4 G/DL
IMM GRANULOCYTES # BLD AUTO: 0.02 K/UL
IMM GRANULOCYTES NFR BLD AUTO: 0.3 %
LYMPHOCYTES # BLD AUTO: 3.9 K/UL
LYMPHOCYTES NFR BLD: 61.3 %
MAGNESIUM SERPL-MCNC: 2.2 MG/DL
MCH RBC QN AUTO: 30 PG
MCHC RBC AUTO-ENTMCNC: 32.1 G/DL
MCV RBC AUTO: 93 FL
MONOCYTES # BLD AUTO: 0.5 K/UL
MONOCYTES NFR BLD: 7.5 %
NEUTROPHILS # BLD AUTO: 1.8 K/UL
NEUTROPHILS NFR BLD: 28.1 %
NRBC BLD-RTO: 0 /100 WBC
PHOSPHATE SERPL-MCNC: 4 MG/DL
PLATELET # BLD AUTO: 303 K/UL
PMV BLD AUTO: 10.6 FL
POTASSIUM SERPL-SCNC: 4.3 MMOL/L
PROT SERPL-MCNC: 7.9 G/DL
RBC # BLD AUTO: 4.14 M/UL
SODIUM SERPL-SCNC: 141 MMOL/L
WBC # BLD AUTO: 6.38 K/UL

## 2018-12-22 PROCEDURE — 84100 ASSAY OF PHOSPHORUS: CPT

## 2018-12-22 PROCEDURE — 25000003 PHARM REV CODE 250: Performed by: NURSE PRACTITIONER

## 2018-12-22 PROCEDURE — 80053 COMPREHEN METABOLIC PANEL: CPT

## 2018-12-22 PROCEDURE — 25000003 PHARM REV CODE 250: Performed by: PHYSICIAN ASSISTANT

## 2018-12-22 PROCEDURE — A4216 STERILE WATER/SALINE, 10 ML: HCPCS | Performed by: NURSE PRACTITIONER

## 2018-12-22 PROCEDURE — 36415 COLL VENOUS BLD VENIPUNCTURE: CPT

## 2018-12-22 PROCEDURE — 63600175 PHARM REV CODE 636 W HCPCS: Performed by: NURSE PRACTITIONER

## 2018-12-22 PROCEDURE — 85025 COMPLETE CBC W/AUTO DIFF WBC: CPT

## 2018-12-22 PROCEDURE — 99239 HOSP IP/OBS DSCHRG MGMT >30: CPT | Mod: ,,, | Performed by: PSYCHIATRY & NEUROLOGY

## 2018-12-22 PROCEDURE — 83735 ASSAY OF MAGNESIUM: CPT

## 2018-12-22 RX ORDER — ATORVASTATIN CALCIUM 80 MG/1
80 TABLET, FILM COATED ORAL DAILY
Qty: 90 TABLET | Refills: 3 | Status: SHIPPED | OUTPATIENT
Start: 2018-12-23 | End: 2019-12-31

## 2018-12-22 RX ORDER — CLOPIDOGREL BISULFATE 75 MG/1
75 TABLET ORAL DAILY
Qty: 30 TABLET | Refills: 0 | Status: SHIPPED | OUTPATIENT
Start: 2018-12-23 | End: 2019-01-29

## 2018-12-22 RX ORDER — CLOPIDOGREL BISULFATE 75 MG/1
75 TABLET ORAL DAILY
Qty: 30 TABLET | Refills: 11 | Status: SHIPPED | OUTPATIENT
Start: 2018-12-23 | End: 2018-12-22

## 2018-12-22 RX ORDER — ASPIRIN 81 MG/1
81 TABLET ORAL DAILY
Refills: 0 | COMMUNITY
Start: 2018-12-23 | End: 2019-12-23

## 2018-12-22 RX ADMIN — CLOPIDOGREL 75 MG: 75 TABLET, FILM COATED ORAL at 09:12

## 2018-12-22 RX ADMIN — AMLODIPINE BESYLATE 10 MG: 10 TABLET ORAL at 09:12

## 2018-12-22 RX ADMIN — ASPIRIN 81 MG: 81 TABLET, COATED ORAL at 09:12

## 2018-12-22 RX ADMIN — ATORVASTATIN CALCIUM 80 MG: 20 TABLET, FILM COATED ORAL at 09:12

## 2018-12-22 RX ADMIN — Medication 3 ML: at 06:12

## 2018-12-22 RX ADMIN — HEPARIN SODIUM 5000 UNITS: 5000 INJECTION, SOLUTION INTRAVENOUS; SUBCUTANEOUS at 05:12

## 2018-12-22 RX ADMIN — HEPARIN SODIUM 5000 UNITS: 5000 INJECTION, SOLUTION INTRAVENOUS; SUBCUTANEOUS at 02:12

## 2018-12-22 RX ADMIN — Medication 3 ML: at 02:12

## 2018-12-22 NOTE — PROGRESS NOTES
[facility]  Vascular Neurology  Comprehensive Stroke Center  Progress Note    Assessment/Plan:     * Embolic stroke involving right middle cerebral artery    47 y/o female with facial droop and dysarthria with infarct right insula    Antithrombotics: ASA 81 mg daily and Plavix 75 mg daily for 30 days    Statins: Lipitor 80 mg daily    Aggressive risk factor modification: HTN     Rehab efforts: PT/OT/SLP to evaluate and treat    Diagnostics ordered/pendin day cardiac monitoring    VTE prophylaxis: Heparin 5000 units SQ every 8 hours    BP parameters: Infarct: No intervention, SBP <220         Mixed hyperlipidemia    - started Lipitor 80 mg daily   - goal LDL < 70      Dysarthria due to acute stroke    Due to stroke  Aggressive therapy         Facial droop due to acute stroke    Due to stroke  Aggressive therapy         Hypertension    Started Amlodipine 10 mg daily   rec long term SBP <130 mmHg          Ms Ryan is a 47 y/o AAF with PMHx of HTN, HLD presented to the ED with left facial droop and dysarthria.     MRI brain revealed right MCA territory - insular region ischemic stroke. MRA head and neck was negative for any critical stenosis but showed bilateral ICA < 50% stenosis. Echo revealed Valvular strands on the tips of mitral valve, likely degenerative chordae recommending clinical correlation for endocarditis in the setting of recent stroke. BCx x 2 were negative for any bacterial growth at 24 hours and Sed rate was 53. She had been afebrile and no leukocytosis. Pt was started and discharged on ASA 81 mg for lifelong and Plavix 75 mg daily for 30 days, Lipitor 80 mg daily. Etiology of stroke was ESUS and pt was discharged in stable condition with 30 day cardiac monitor. Pt will be seen in VN clinic @ 4 - 6 weeks.     STROKE DOCUMENTATION        NIH Scale:  1a. Level of Consciousness: 0-->Alert, keenly responsive  1b. LOC Questions: 0-->Answers both questions correctly  1c. LOC  Commands: 0-->Performs both tasks correctly  2. Best Gaze: 0-->Normal  3. Visual: 0-->No visual loss  4. Facial Palsy: 1-->Minor paralysis (flattened nasolabial fold, asymmetry on smiling)  5a. Motor Arm, Left: 0-->No drift, limb holds 90 (or 45) degrees for full 10 secs  5b. Motor Arm, Right: 0-->No drift, limb holds 90 (or 45) degrees for full 10 secs  6a. Motor Leg, Left: 0-->No drift, leg holds 30 degree position for full 5 secs  6b. Motor Leg, Right: 0-->No drift, leg holds 30 degree position for full 5 secs  7. Limb Ataxia: 0-->Absent  8. Sensory: 0-->Normal, no sensory loss  9. Best Language: 0-->No aphasia, normal  10. Dysarthria: 1-->Mild-to-moderate dysarthria, patient slurs at least some words and, at worst, can be understood with some difficulty  11. Extinction and Inattention (formerly Neglect): 0-->No abnormality  Total (NIH Stroke Scale): 2       Modified Cro[place]on Score: 1  [place] Coma Scale:15   ABCD2 Score:    [id number] Score:   HAS -BLED Score:   ICH Score:   Hunt & Rubio Classification:      Hemorrhagic change of an Ischemic Stroke: Does this patient have an ischemic stroke with hemorrhagic changes? No     Neurologic Chief Complaint: Dysarthria and left facial droop.     Subjective:     Interval History: Patient is seen for follow-up neurological assessment and treatment recommendations: Significant improvement in left arm weakness and left facial droop.     HPI, Past Medical, Family, and Social History remains the same as documented in the initial encounter.     Review of Systems   Constitutional: Negative for appetite change, diaphoresis and fatigue.   HENT: Negative for ear discharge and ear pain.    Eyes: Negative for pain and itching.   Respiratory: Negative for apnea and chest tightness.    Cardiovascular: Negative for chest pain and leg swelling.   Gastrointestinal: Negative for abdominal pain.   Genitourinary: Negative for flank pain.   Musculoskeletal: Negative for back pain.  "  Neurological: Positive for facial asymmetry and speech difficulty.     Scheduled Meds:   amLODIPine  10 mg Oral Daily    aspirin  81 mg Oral Daily    atorvastatin  80 mg Oral Daily    clopidogrel  75 mg Oral Daily    heparin (porcine)  5,000 Units Subcutaneous Q8H    sodium chloride 0.9%  3 mL Intravenous Q8H     Continuous Infusions:   sodium chloride 0.9%       PRN Meds:acetaminophen, ondansetron, polyethylene glycol, sodium chloride 0.9%    Objective:     Vital Signs (Most Recent):  Temp: 98 °F (36.7 °C) (12/22/18 1244)  Pulse: 73 (12/22/18 1244)  Resp: 18 (12/22/18 1244)  BP: 139/83 (12/22/18 1244)  SpO2: (!) 94 % (12/22/18 1244)  BP Location: Right arm    Vital Signs Range (Last 24H):  Temp:  [96.7 °F (35.9 °C)-98.7 °F (37.1 °C)]   Pulse:  []   Resp:  [16-18]   BP: (101-142)/(54-83)   SpO2:  [94 %-98 %]   BP Location: Right arm    Physical Exam    Neurological Exam:     MSE - AAO x 3 with normal comprehension, mild dysarthria, difficulty saying "mama"  CN - PERRLA, EOMI, no nystagmus, VF intact, slight left nasolabial fold flattening, no facial numbness, hearing intact bilaterally  Strength - 5 /5 all 4 limbs   Sensory - intact LT all 4 ext   Co - ordination - intact FTN and HTS bilaterally   Gait - normal        Laboratory:  CMP:   Recent Labs   Lab 12/22/18  0450   CALCIUM 9.7   ALBUMIN 3.7   PROT 7.9      K 4.3   CO2 26      BUN 21*   CREATININE 1.0   ALKPHOS 78   ALT 19   AST 19   BILITOT 0.7     BMP:   Recent Labs   Lab 12/22/18  0450      K 4.3      CO2 26   BUN 21*   CREATININE 1.0   CALCIUM 9.7     CBC:   Recent Labs   Lab 12/22/18  0450   WBC 6.38   RBC 4.14   HGB 12.4   HCT 38.6      MCV 93   MCH 30.0   MCHC 32.1     Lipid Panel:   Recent Labs   Lab 12/20/18  2301   CHOL 293*   LDLCALC 224.8*   HDL 50   TRIG 91     Coagulation:   Recent Labs   Lab 12/21/18  0504   INR 1.1   APTT 24.6     Platelet Aggregation Study: No results for input(s): PLTAGG, " PLTAGINTERP, PLTAGREGLACO, ADPPLTAGGREG in the last 168 hours.  Hgb A1C:   Recent Labs   Lab 12/20/18  2159   HGBA1C 5.5     TSH:   Recent Labs   Lab 12/20/18  2301   TSH 1.397       Diagnostic Results     Brain Imaging         Right MCA territory ischemic stoke     Vessel Imaging         Bilateral ICA atherosclerosis no critical    Cardiac Imaging     · Echo - Normal left ventricular systolic function. The estimated ejection fraction is 55%  · Mild concentric left ventricular hypertrophy.  · Normal LV diastolic function.  · Mild mitral sclerosis.  · Mild mitral regurgitation.  · Mild to moderate tricuspid regurgitation.  · Normal right ventricular systolic function.  · Trivial Pericardial effusion.  Valvular strands noted on the tips of mitral valve, likely degenerative chordae but would correlate clinically for endocarditis in the setting of recent stroke.        Herson Smith MD  Comprehensive Stroke Center  Department of Vascular Neurology   [facility]

## 2018-12-22 NOTE — ASSESSMENT & PLAN NOTE
47 y/o female with facial droop and dysarthria with infarct right insula    Antithrombotics: ASA 81 mg daily and Plavix 75 mg daily for 30 days    Statins: Lipitor 80 mg daily    Aggressive risk factor modification: HTN     Rehab efforts: PT/OT/SLP to evaluate and treat    Diagnostics ordered/pendin day cardiac monitoring    VTE prophylaxis: Heparin 5000 units SQ every 8 hours    BP parameters: Infarct: No intervention, SBP <220

## 2018-12-22 NOTE — PLAN OF CARE
"Ms. Ryan is 47 y/o woman who presented with L facial droop and dysarthria, found to have an acute infarction in the Right insula on MRI.  UTox was WNL. ; increased Atorvastatin 40mg to 80 mg Daily. Initial concern that pt did not have insurance, however confirmed that she does. Will plan for DAPT x 30 days followed by ASA monotherapy.    Stroke etiology suspected ESUS at this time. Echo demonstrated "valvular strands on the tips of mitral valve, likely degenerative chordae but would correlate clinically for endocarditis in the setting of recent stroke."    Discussed with staff Diane; pt is afebrile, without complaints currently, no leukocytosis, however as pt is with unknown suspected embolic source, feel that further efforts to rule out possible underlying infection/endocarditis are indicated. Ordered ESR and blood culture x 2; will follow up labs in the AM.    If no acute findings, pt likely able to d/c home 12/22 AM with plan for outpatient SLP and 30-day cardiac event monitor. She is also a candidate for the Jeana trial; coordinator to discuss further details with patient.        Yessenia Soto PA-C  Comprehensive Stroke Center - [id number]  Department of Vascular Neurology   [facility]  "

## 2018-12-22 NOTE — ASSESSMENT & PLAN NOTE
47 y/o female with facial droop and dysarthria with infarct right insula    Antithrombotics: ASA 81 mg daily and Plavix 75 mg daily for 30 days    Statins: Lipitor 80 mg daily    Aggressive risk factor modification: HTN HLD, Obesity     Rehab efforts: PT/OT/SLP to evaluate and treat    Diagnostics ordered/pendin day cardiac monitoring    VTE prophylaxis: Heparin 5000 units SQ every 8 hours    BP parameters: Infarct: No intervention, SBP <220

## 2018-12-22 NOTE — HOSPITAL COURSE
Ms Ryan is a 47 y/o AAF with PMHx of HTN, HLD presented to the ED with left facial droop and dysarthria.     MRI brain revealed right MCA territory - insular region ischemic stroke. MRA head and neck was negative for any critical stenosis but showed bilateral ICA < 50% stenosis. Echo revealed Valvular strands on the tips of mitral valve, likely degenerative chordae recommending clinical correlation for endocarditis in the setting of recent stroke. BCx x 2 were negative for any bacterial growth at 24 hours and Sed rate was 53. She had been afebrile and no leukocytosis. Pt was started and discharged on ASA 81 mg for lifelong and Plavix 75 mg daily for 30 days, Lipitor 80 mg daily. Etiology of stroke was ESUS and pt was discharged in stable condition with 30 day cardiac monitor. Pt will be seen in VN clinic @ 4 - 6 weeks.

## 2018-12-22 NOTE — PLAN OF CARE
Problem: Adult Inpatient Plan of Care  Goal: Plan of Care Review  Outcome: Ongoing (interventions implemented as appropriate)  Plan of care reviewed with pt. Pt voiced understanding. Pt AAOx4. Pt denies any c/o during the shift. No apparent distress noted. Stroke education provided. Vital Signs Stable. Fall precautions maintained. Bed in lowest position, and locked. Call light within reach and advised to call for assistance. Side rails x 2 and slip resistant socks on at this time. Will continue to monitor.

## 2018-12-22 NOTE — DISCHARGE SUMMARY
[facility]  Vascular Neurology  Comprehensive Stroke Center  Discharge Summary     Summary:     Admit Date: 12/20/2018  9:26 PM    Discharge Date and Time:  12/22/2018 4:46 PM    Attending Physician: Sunni Contreras MD     Discharge Provider: Herson Smith MD    History of Present Illness: 47 y/o female who was noted to have facial droop on left and dysarthria starting on Tuesday she was finally made to come to the ED on 12-20-18 by her mother because she thought she might be having a stroke.   Patient with histroy of HTN and compliant with meds.   CT scan shows acute infarct in the right insular cortex      Hospital Course (synopsis of major diagnoses, care, treatment, and services provided during the course of the hospital stay): Ms Ryan is a 47 y/o AAF with PMHx of HTN, HLD presented to the ED with left facial droop and dysarthria.     MRI brain revealed right MCA territory - insular region ischemic stroke. MRA head and neck was negative for any critical stenosis but showed bilateral ICA < 50% stenosis. Echo revealed Valvular strands on the tips of mitral valve, likely degenerative chordae recommending clinical correlation for endocarditis in the setting of recent stroke. BCx x 2 were negative for any bacterial growth at 24 hours and Sed rate was 53. She had been afebrile and no leukocytosis. Pt was started and discharged on ASA 81 mg for lifelong and Plavix 75 mg daily for 30 days, Lipitor 80 mg daily. Etiology of stroke was ESUS and pt was discharged in stable condition with 30 day cardiac monitor. Pt will be seen in VN clinic @ 4 - 6 weeks.     Stroke Etiology: Undetermined Cause. 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 Level of Consciousness: 0-->Alert, keenly responsive  1b. LOC Questions: 0-->Answers both questions correctly  1c. LOC Commands: 0-->Performs both tasks correctly  2. Best Gaze:  0-->Normal  3. Visual: 0-->No visual loss  4. Facial Palsy: 1-->Minor paralysis (flattened nasolabial fold, asymmetry on smiling)  5a. Motor Arm, Left: 0-->No drift, limb holds 90 (or 45) degrees for full 10 secs  5b. Motor Arm, Right: 0-->No drift, limb holds 90 (or 45) degrees for full 10 secs  6a. Motor Leg, Left: 0-->No drift, leg holds 30 degree position for full 5 secs  6b. Motor Leg, Right: 0-->No drift, leg holds 30 degree position for full 5 secs  7. Limb Ataxia: 0-->Absent  8. Sensory: 0-->Normal, no sensory loss  9. Best Language: 0-->No aphasia, normal  10. Dysarthria: 1-->Mild-to-moderate dysarthria, patient slurs at least some words and, at worst, can be understood with some difficulty  11. Extinction and Inattention (formerly Neglect): 0-->No abnormality  Total (NIH Stroke Scale): 2        Modified [place] Score: 1  [place] Coma Scale:15   ABCD2 Score:    [id number]-GPP Score:   HAS -BLED Score:   ICH Score:   Hunt & Rubio Classification:       Assessment/Plan:     Diagnostic Results:    Diagnostic Results      Brain Imaging           Right MCA territory ischemic stoke      Vessel Imaging           Bilateral ICA atherosclerosis no critical     Cardiac Imaging      · Echo - Normal left ventricular systolic function. The estimated ejection fraction is 55%  · Mild concentric left ventricular hypertrophy.  · Normal LV diastolic function.  · Mild mitral sclerosis.  · Mild mitral regurgitation.  · Mild to moderate tricuspid regurgitation.  · Normal right ventricular systolic function.  · Trivial Pericardial effusion.  Valvular strands noted on the tips of mitral valve, likely degenerative chordae but would correlate clinically for endocarditis in the setting of recent stroke.      Interventions: None    Complications: None    Disposition: Home or Self Care    Final Active Diagnoses:    Diagnosis Date Noted POA    PRINCIPAL PROBLEM:  Embolic stroke involving right middle cerebral artery [I63.411] 12/21/2018 Yes     Mixed hyperlipidemia [E78.2] 2018 Unknown    Hypertension [I10] 2018 Yes    Facial droop due to acute stroke [I63.9, R29.810] 2018 Yes    Dysarthria due to acute stroke [I63.9, R47.1] 2018 Yes      Problems Resolved During this Admission:     * Embolic stroke involving right middle cerebral artery    49 y/o female with facial droop and dysarthria with infarct right insula    Antithrombotics: ASA 81 mg daily and Plavix 75 mg daily for 30 days    Statins: Lipitor 80 mg daily    Aggressive risk factor modification: HTN HLD, Obesity     Rehab efforts: PT/OT/SLP to evaluate and treat    Diagnostics ordered/pendin day cardiac monitoring    VTE prophylaxis: Heparin 5000 units SQ every 8 hours    BP parameters: Infarct: No intervention, SBP <220             Recommendations:     Post-discharge complication risks: Falls    Stroke Education given to: patient and family    Follow-up in Stroke Clinic in 4 - 6 weeks.     Discharge Plan:  Antithrombotics: Aspirin 81 mg daily lifelong and Plavix 7 5mg daily for 30 days  Antihypertensive - Amlodipine 10 mg daily   HLD - Lipitor 80 mg daily     Follow Up:  Follow-up Information     [facility].    Why:  Hospital Follow-up/ Please contact clinic to schedule an appointment. Please bring ID, proof of address and proof of income to appointment.  Contact information:  [address] [facility] [address] [phone]             [facility] VASCULAR NEUROLOGY In 6 weeks.    Specialty:  Vascular Neurology  Contact information:  [address] [facility] [id number] [phone]                 Patient Instructions:      Diet Cardiac     Notify your health care provider if you experience any of the following:  temperature >100.4     Notify your health care provider if you experience any of the following:  persistent nausea and vomiting or diarrhea     Notify your health care provider if you experience any of the following:   redness, tenderness, or signs of infection (pain, swelling, redness, odor or green/yellow discharge around incision site)     Cardiac event monitor   Standing Status: Future Standing Exp. Date: 12/21/19     Order Specific Question Answer Comments   Cardiac Event Monitor Auto Trigger        Medications:  Reconciled Home Medications:      Medication List      START taking these medications    aspirin 81 MG EC tablet  Commonly known as:  ECOTRIN  Take 1 tablet (81 mg total) by mouth once daily.  Start taking on:  12/23/2018     atorvastatin 80 MG tablet  Commonly known as:  LIPITOR  Take 1 tablet (80 mg total) by mouth once daily.  Start taking on:  12/23/2018     clopidogrel 75 mg tablet  Commonly known as:  PLAVIX  Take 1 tablet (75 mg total) by mouth once daily.  Start taking on:  12/23/2018        CONTINUE taking these medications    amLODIPine 10 MG tablet  Commonly known as:  NORVASC  Take 10 mg by mouth once daily.        STOP taking these medications    hydroCHLOROthiazide 12.5 MG Tab  Commonly known as:  HYDRODIURIL            Herson Smith MD  Comprehensive Stroke Center  Department of Vascular Neurology   [facility]

## 2018-12-22 NOTE — PLAN OF CARE
Problem: Adult Inpatient Plan of Care  Goal: Plan of Care Review  Outcome: Ongoing (interventions implemented as appropriate)  POC reviewed with pt and family. Pt's family came by too late for fellow to round. Family stated that they will be by early in the am. No change in pt's neuro status, VSS. AX4, amb. Bed locked in lowest position, call light within reach. No distress noted, will continue to monitor

## 2018-12-22 NOTE — SUBJECTIVE & OBJECTIVE
"Neurologic Chief Complaint: Dysarthria and left facial droop.     Subjective:     Interval History: Patient is seen for follow-up neurological assessment and treatment recommendations: Significant improvement in left arm weakness and left facial droop.     HPI, Past Medical, Family, and Social History remains the same as documented in the initial encounter.     Review of Systems   Constitutional: Negative for appetite change, diaphoresis and fatigue.   HENT: Negative for ear discharge and ear pain.    Eyes: Negative for pain and itching.   Respiratory: Negative for apnea and chest tightness.    Cardiovascular: Negative for chest pain and leg swelling.   Gastrointestinal: Negative for abdominal pain.   Genitourinary: Negative for flank pain.   Musculoskeletal: Negative for back pain.   Neurological: Positive for facial asymmetry and speech difficulty.     Scheduled Meds:   amLODIPine  10 mg Oral Daily    aspirin  81 mg Oral Daily    atorvastatin  80 mg Oral Daily    clopidogrel  75 mg Oral Daily    heparin (porcine)  5,000 Units Subcutaneous Q8H    sodium chloride 0.9%  3 mL Intravenous Q8H     Continuous Infusions:   sodium chloride 0.9%       PRN Meds:acetaminophen, ondansetron, polyethylene glycol, sodium chloride 0.9%    Objective:     Vital Signs (Most Recent):  Temp: 98 °F (36.7 °C) (12/22/18 1244)  Pulse: 73 (12/22/18 1244)  Resp: 18 (12/22/18 1244)  BP: 139/83 (12/22/18 1244)  SpO2: (!) 94 % (12/22/18 1244)  BP Location: Right arm    Vital Signs Range (Last 24H):  Temp:  [96.7 °F (35.9 °C)-98.7 °F (37.1 °C)]   Pulse:  []   Resp:  [16-18]   BP: (101-142)/(54-83)   SpO2:  [94 %-98 %]   BP Location: Right arm    Physical Exam    Neurological Exam:     MSE - AAO x 3 with normal comprehension, mild dysarthria, difficulty saying "mama"  CN - PERRLA, EOMI, no nystagmus, VF intact, slight left nasolabial fold flattening, no facial numbness, hearing intact bilaterally  Strength - 5 /5 all 4 limbs "   Sensory - intact LT all 4 ext   Co - ordination - intact FTN and HTS bilaterally   Gait - normal        Laboratory:  CMP:   Recent Labs   Lab 12/22/18  0450   CALCIUM 9.7   ALBUMIN 3.7   PROT 7.9      K 4.3   CO2 26      BUN 21*   CREATININE 1.0   ALKPHOS 78   ALT 19   AST 19   BILITOT 0.7     BMP:   Recent Labs   Lab 12/22/18  0450      K 4.3      CO2 26   BUN 21*   CREATININE 1.0   CALCIUM 9.7     CBC:   Recent Labs   Lab 12/22/18  0450   WBC 6.38   RBC 4.14   HGB 12.4   HCT 38.6      MCV 93   MCH 30.0   MCHC 32.1     Lipid Panel:   Recent Labs   Lab 12/20/18  2301   CHOL 293*   LDLCALC 224.8*   HDL 50   TRIG 91     Coagulation:   Recent Labs   Lab 12/21/18  0504   INR 1.1   APTT 24.6     Platelet Aggregation Study: No results for input(s): PLTAGG, PLTAGINTERP, PLTAGREGLACO, ADPPLTAGGREG in the last 168 hours.  Hgb A1C:   Recent Labs   Lab 12/20/18  2159   HGBA1C 5.5     TSH:   Recent Labs   Lab 12/20/18  2301   TSH 1.397       Diagnostic Results     Brain Imaging         Right MCA territory ischemic stoke     Vessel Imaging         Bilateral ICA atherosclerosis no critical    Cardiac Imaging     · Echo - Normal left ventricular systolic function. The estimated ejection fraction is 55%  · Mild concentric left ventricular hypertrophy.  · Normal LV diastolic function.  · Mild mitral sclerosis.  · Mild mitral regurgitation.  · Mild to moderate tricuspid regurgitation.  · Normal right ventricular systolic function.  · Trivial Pericardial effusion.  Valvular strands noted on the tips of mitral valve, likely degenerative chordae but would correlate clinically for endocarditis in the setting of recent stroke.

## 2018-12-22 NOTE — PROGRESS NOTES
Pt tolerated meds and meals today, family at bedside, IV dcd intact, no redness swelling or drainage noted, tele dcd, d/c instructions given ,pt and family verbalized understanding, vss no distress, pt dcd ambulatory family per request

## 2018-12-24 NOTE — PLAN OF CARE
12/24/18 0802   Final Note   Assessment Type Final Discharge Note   Anticipated Discharge Disposition Home   Right Care Referral Info   Post Acute Recommendation No Care

## 2018-12-26 LAB
BACTERIA BLD CULT: NORMAL
BACTERIA BLD CULT: NORMAL

## 2018-12-27 ENCOUNTER — CLINICAL SUPPORT (OUTPATIENT)
Dept: CARDIOLOGY | Facility: HOSPITAL | Age: 48
End: 2018-12-27
Attending: PHYSICIAN ASSISTANT
Payer: MEDICAID

## 2018-12-27 ENCOUNTER — TELEPHONE (OUTPATIENT)
Dept: NEUROSURGERY | Facility: HOSPITAL | Age: 48
End: 2018-12-27

## 2018-12-27 DIAGNOSIS — I63.411 EMBOLIC STROKE INVOLVING RIGHT MIDDLE CEREBRAL ARTERY: ICD-10-CM

## 2018-12-27 PROCEDURE — 93272 ECG/REVIEW INTERPRET ONLY: CPT | Mod: ,,, | Performed by: INTERNAL MEDICINE

## 2018-12-27 PROCEDURE — 93271 ECG/MONITORING AND ANALYSIS: CPT

## 2018-12-27 NOTE — TELEPHONE ENCOUNTER
Spoke with patient. Risk factors to patient for stroke discussed with teach back method implemented. Patient verbalized understanding of discharge instructions and medications. Follow up appointment discussed. Warning signs were discussed with teach back method implemented. Instructed patient to seek medical help via 911 if new symptoms occur. Patient relayed no new questions or comments at this time.

## 2019-01-06 ENCOUNTER — HOSPITAL ENCOUNTER (EMERGENCY)
Facility: HOSPITAL | Age: 49
Discharge: HOME | End: 2019-01-06
Attending: EMERGENCY MEDICINE
Payer: MEDICAID

## 2019-01-06 VITALS
WEIGHT: 218 LBS | BODY MASS INDEX: 34.21 KG/M2 | HEIGHT: 67 IN | OXYGEN SATURATION: 98 % | TEMPERATURE: 98 F | SYSTOLIC BLOOD PRESSURE: 130 MMHG | RESPIRATION RATE: 20 BRPM | DIASTOLIC BLOOD PRESSURE: 60 MMHG | HEART RATE: 71 BPM

## 2019-01-06 DIAGNOSIS — R11.0 NAUSEA: Primary | ICD-10-CM

## 2019-01-06 DIAGNOSIS — R06.02 SHORTNESS OF BREATH: ICD-10-CM

## 2019-01-06 LAB
ALBUMIN SERPL BCP-MCNC: 4.3 G/DL
ALP SERPL-CCNC: 101 U/L
ALT SERPL W/O P-5'-P-CCNC: 23 U/L
ANION GAP SERPL CALC-SCNC: 12 MMOL/L
AST SERPL-CCNC: 25 U/L
BASOPHILS # BLD AUTO: 0.05 K/UL
BASOPHILS NFR BLD: 0.6 %
BILIRUB SERPL-MCNC: 0.5 MG/DL
BNP SERPL-MCNC: 29 PG/ML
BUN SERPL-MCNC: 12 MG/DL
CALCIUM SERPL-MCNC: 9.9 MG/DL
CHLORIDE SERPL-SCNC: 103 MMOL/L
CO2 SERPL-SCNC: 25 MMOL/L
CREAT SERPL-MCNC: 1 MG/DL
DIFFERENTIAL METHOD: ABNORMAL
EOSINOPHIL # BLD AUTO: 0 K/UL
EOSINOPHIL NFR BLD: 0.5 %
ERYTHROCYTE [DISTWIDTH] IN BLOOD BY AUTOMATED COUNT: 11.9 %
EST. GFR  (AFRICAN AMERICAN): >60 ML/MIN/1.73 M^2
EST. GFR  (NON AFRICAN AMERICAN): >60 ML/MIN/1.73 M^2
GLUCOSE SERPL-MCNC: 107 MG/DL
HCT VFR BLD AUTO: 40.2 %
HGB BLD-MCNC: 12.8 G/DL
IMM GRANULOCYTES # BLD AUTO: 0.02 K/UL
IMM GRANULOCYTES NFR BLD AUTO: 0.3 %
LIPASE SERPL-CCNC: 24 U/L
LYMPHOCYTES # BLD AUTO: 2.1 K/UL
LYMPHOCYTES NFR BLD: 26.5 %
MCH RBC QN AUTO: 30 PG
MCHC RBC AUTO-ENTMCNC: 31.8 G/DL
MCV RBC AUTO: 94 FL
MONOCYTES # BLD AUTO: 0.4 K/UL
MONOCYTES NFR BLD: 5.3 %
NEUTROPHILS # BLD AUTO: 5.3 K/UL
NEUTROPHILS NFR BLD: 66.8 %
NRBC BLD-RTO: 0 /100 WBC
PLATELET # BLD AUTO: 373 K/UL
PMV BLD AUTO: 10.2 FL
POTASSIUM SERPL-SCNC: 3.9 MMOL/L
PROT SERPL-MCNC: 9 G/DL
RBC # BLD AUTO: 4.26 M/UL
SODIUM SERPL-SCNC: 140 MMOL/L
TROPONIN I SERPL DL<=0.01 NG/ML-MCNC: <0.006 NG/ML
WBC # BLD AUTO: 7.95 K/UL

## 2019-01-06 PROCEDURE — 80053 COMPREHEN METABOLIC PANEL: CPT

## 2019-01-06 PROCEDURE — 83690 ASSAY OF LIPASE: CPT

## 2019-01-06 PROCEDURE — 85025 COMPLETE CBC W/AUTO DIFF WBC: CPT

## 2019-01-06 PROCEDURE — 93010 EKG 12-LEAD: ICD-10-PCS | Mod: ,,, | Performed by: INTERNAL MEDICINE

## 2019-01-06 PROCEDURE — 83880 ASSAY OF NATRIURETIC PEPTIDE: CPT

## 2019-01-06 PROCEDURE — 93005 ELECTROCARDIOGRAM TRACING: CPT

## 2019-01-06 PROCEDURE — 99284 PR EMERGENCY DEPT VISIT,LEVEL IV: ICD-10-PCS | Mod: ,,, | Performed by: EMERGENCY MEDICINE

## 2019-01-06 PROCEDURE — 99284 EMERGENCY DEPT VISIT MOD MDM: CPT | Mod: 25

## 2019-01-06 PROCEDURE — 96374 THER/PROPH/DIAG INJ IV PUSH: CPT

## 2019-01-06 PROCEDURE — 99284 EMERGENCY DEPT VISIT MOD MDM: CPT | Mod: ,,, | Performed by: EMERGENCY MEDICINE

## 2019-01-06 PROCEDURE — 84484 ASSAY OF TROPONIN QUANT: CPT

## 2019-01-06 PROCEDURE — 63600175 PHARM REV CODE 636 W HCPCS: Performed by: EMERGENCY MEDICINE

## 2019-01-06 PROCEDURE — 93010 ELECTROCARDIOGRAM REPORT: CPT | Mod: ,,, | Performed by: INTERNAL MEDICINE

## 2019-01-06 RX ORDER — ONDANSETRON 4 MG/1
4 TABLET, ORALLY DISINTEGRATING ORAL EVERY 8 HOURS PRN
Qty: 12 TABLET | Refills: 0 | Status: SHIPPED | OUTPATIENT
Start: 2019-01-06

## 2019-01-06 RX ORDER — ONDANSETRON 2 MG/ML
4 INJECTION INTRAMUSCULAR; INTRAVENOUS
Status: COMPLETED | OUTPATIENT
Start: 2019-01-06 | End: 2019-01-06

## 2019-01-06 RX ORDER — ONDANSETRON 4 MG/1
4 TABLET, ORALLY DISINTEGRATING ORAL EVERY 8 HOURS PRN
Qty: 12 TABLET | Refills: 0 | Status: SHIPPED | OUTPATIENT
Start: 2019-01-06 | End: 2019-01-06 | Stop reason: SDUPTHER

## 2019-01-06 RX ADMIN — ONDANSETRON 4 MG: 2 INJECTION INTRAMUSCULAR; INTRAVENOUS at 12:01

## 2019-01-06 NOTE — ED NOTES
Lor Ryan, a 48 y.o. female presents to the ED via PMV with CC SOB          Patient identifiers verified verbally with armband and correct for Lor Ryan.    LOC/ APPEARANCE: The patient is AAOx4. Pt is speaking appropriately, no slurred speech. Pt changed into hospital gown. Continuous cardiac monitor, cont pulse ox, and auto BP cuff applied to patient. Pt is clean and well groomed. No JVD visible. Pt reports pain level of 0. Pt updated on POC. Bed low and locked with side rails up x2, call bell in pt reach.  SKIN: Skin is warm dry and intact, and color is consistent with ethnicity. Capillary refill <3 seconds. No breakdown or brusing visible. Mucus membranes moist, acyanotic.  RESPIRATORY: Airway is open and patent. Respirations-spontaneous, unlabored, regular rate, equal bilaterally on inspiration and expiration. No accessory muscle use noted. Lungs clear to auscultation in all fields bilaterally anterior and posterior.   CARDIAC: Patient has regular heart rate. NSR No peripheral edema noted, and patient has no c/o chest pain. Peripheral pulses present equal and strong throughout.  ABDOMEN: Soft and non-tender to palpation with no distention noted. Normoactive bowel sounds x4 quadrants. Pt has no complaints of abnormal bowel movements, denies blood in stool. Pt reports normal appetite. PT C\O NAUSEA DENIES VOMITING  NEUROLOGIC: Eyes open spontaneously and facial expression symmetrical. Pt behavior appropriate to situation, and pt follows commands. Pt reports sensation present in all extremities when touched with a finger, denies any numbness or tingling. PERRLA  MUSCULOSKELETAL: Spontaneous movement noted to all extremities. Hand  equal and leg strength strong +5 bilaterally.   : No complaints of frequency, burning, urgency or blood in the urine. No complaints of incontinence.

## 2019-01-06 NOTE — ED TRIAGE NOTES
Pt stating she has to take deep breaths to catch her breath. Pt also c\o nausea, and dizziness, starting this morning. Pt denies Cp, Denies Vomiting. Pt has hx of recent stroke 12/27/18

## 2019-01-06 NOTE — ED PROVIDER NOTES
Encounter Date: 2019    SCRIBE #1 NOTE: I, Jovanna Almanza, am scribing for, and in the presence of,  Dr. Phan. I have scribed the entire note.       History     Chief Complaint   Patient presents with    Shortness of Breath     onset this am sob and nausea. Hx of stroke in Dec 2018.  Sudden onset SOB  today at 9am. CP w/ inspiration    Nausea    Chest Pain     Time seen by provider: 12:12 PM    This is a 48 y.o. Female s/p stroke on 2018 with medical conditions including HTN, who presents with complaint of sudden onset SOB and abdominal pain around 9:00AM after taking medications, including Norvasc, Ecotrin, Lisinopril, Lipitor, and Plavix, which she has been compliant since recent stroke. Patient's associated sx include nausea and chest pain with inspiration. Patient also endorses mild cough. Patient denies fever, chills, vomiting, diarrhea, hematuria, hemoptysis, hematemesis, dysuria, or back pain. Patient reports hx of bronchitis. Denies hx of blood clots. Surgical history includes  x3.  A ten point review of systems was completed and is negative except as documented above.  Patient denies any other acute medical complaint.  The patients available PMH, PSH, medications, allergies, and triage vital signs were reviewed just prior to their medical evaluation.        The history is provided by the patient.     Review of patient's allergies indicates:  No Known Allergies  Past Medical History:   Diagnosis Date    Hypertension     Stroke      History reviewed. No pertinent surgical history.  History reviewed. No pertinent family history.  Social History     Tobacco Use    Smoking status: Never Smoker    Smokeless tobacco: Never Used   Substance Use Topics    Alcohol use: No     Frequency: Never    Drug use: No     Review of Systems   Constitutional: Negative for chills and fever.   HENT: Negative for rhinorrhea and sore throat.    Eyes: Negative for visual disturbance.    Respiratory: Positive for cough and shortness of breath.    Cardiovascular: Positive for chest pain (with inspiration).   Gastrointestinal: Positive for abdominal pain and nausea. Negative for vomiting.   Genitourinary: Negative for dysuria and flank pain.   Musculoskeletal: Negative for back pain and gait problem.   Neurological: Negative for weakness and numbness.   Psychiatric/Behavioral: Negative for behavioral problems and confusion.       Physical Exam     Initial Vitals [01/06/19 1142]   BP Pulse Resp Temp SpO2   (!) 177/95 77 20 98.4 °F (36.9 °C) 98 %      MAP       --         Physical Exam    Nursing note and vitals reviewed.  Constitutional: She appears well-developed and well-nourished. She is not diaphoretic. No distress.   HENT:   Head: Normocephalic and atraumatic.   Nose: Nose normal.   Eyes: Conjunctivae are normal. Right eye exhibits no discharge. Left eye exhibits no discharge.   Neck: Normal range of motion. Neck supple.   Cardiovascular: Normal rate, regular rhythm, normal heart sounds and intact distal pulses. Exam reveals no gallop and no friction rub.    No murmur heard.  Pulmonary/Chest: Breath sounds normal. No respiratory distress. She has no wheezes. She has no rhonchi. She has no rales.   Abdominal: Soft. She exhibits no distension. There is no tenderness. There is no rebound and no guarding.   Negative Cody's sign. Negative McBurney's sign.   Musculoskeletal: Normal range of motion. She exhibits no edema or tenderness.   No peripheral swelling   Neurological: She is alert and oriented to person, place, and time. She has normal strength. GCS score is 15. GCS eye subscore is 4. GCS verbal subscore is 5. GCS motor subscore is 6.   Skin: Skin is warm and dry. No rash noted. No erythema.   Psychiatric: She has a normal mood and affect. Her behavior is normal. Judgment and thought content normal.         ED Course   Procedures  Labs Reviewed   COMPREHENSIVE METABOLIC PANEL - Abnormal;  Notable for the following components:       Result Value    Total Protein 9.0 (*)     All other components within normal limits    Narrative:     BNP added per Dr. Phan, order ID [id number] 01/06/19 12:38   CBC W/ AUTO DIFFERENTIAL - Abnormal; Notable for the following components:    MCHC 31.8 (*)     Platelets 373 (*)     All other components within normal limits    Narrative:     BNP added per Dr. Phan, order ID [id number] 01/06/19 12:38   LIPASE    Narrative:     BNP added per Dr. Phan, order ID [id number] 01/06/19 12:38   TROPONIN I    Narrative:     BNP added per Dr. Phan, order ID [id number] 01/06/19 12:38   B-TYPE NATRIURETIC PEPTIDE   B-TYPE NATRIURETIC PEPTIDE    Narrative:     BNP added per Dr. Phan, order ID [id number] 01/06/19 12:38     EKG Readings: (Independently Interpreted)   Initial Reading: No STEMI. Rhythm: Normal Sinus Rhythm. Heart Rate: 79. Ectopy: No Ectopy. Conduction: Normal. ST Segments: Normal ST Segments. T Waves: Normal. Clinical Impression: Normal Sinus Rhythm       Imaging Results          X-Ray Chest PA And Lateral (Final result)  Result time 01/06/19 12:43:51    Final result by Richardson Singh MD (01/06/19 12:43:51)                 Impression:      No detrimental change or radiographic acute intrathoracic process seen.      Electronically signed by: Richardson Singh MD  Date:    01/06/2019  Time:    12:43             Narrative:    EXAMINATION:  XR CHEST PA AND LATERAL    CLINICAL HISTORY:  Shortness of breath    TECHNIQUE:  PA and lateral views of the chest were performed.    COMPARISON:  Chest radiograph 12/21/2018    FINDINGS:  Monitoring leads overlie the chest.No detrimental change.  Cardiomediastinal silhouette is midline and stable in size configuration without evidence of failure.    Pulmonary vasculature and hilar regions are within normal limits.  The lungs are symmetrically well expanded and clear.  No pleural effusion or pneumothorax.    Bones are intact. Large body  habitus.                                 Medical Decision Making:   History:   Old Medical Records: I decided to obtain old medical records.  Independently Interpreted Test(s):   I have ordered and independently interpreted EKG Reading(s) - see prior notes  Clinical Tests:   Lab Tests: Ordered and Reviewed  Radiological Study: Ordered and Reviewed  Medical Tests: Ordered and Reviewed  ED Management:  48-year-old female with a history of CVA presents with 1 day of nausea and shortness of breath.  Vitals with hypertension.  Physical exam benign.  Lungs are clear.  Abdomen is nontender with a negative Cody's and negative McBurney's sign. Doubt acute abdominal surgical emergency.  Labs unremarkable. EKG without evidence of ischemia.  Chest x-ray unremarkable.  Doubt ACS, PE, dissection, PNX, PNA, or tamponade.  Improved with Zofran.  Repeat exam benign.  Will discharge home with close outpatient follow-up.  She will call her regular doctor tomorrow to arrange close follow-up.  Prescribe Zofran for use for nausea as needed.  Patient will return to ED for worsening symptoms, inability to eat/drink, fever greater than 100.4, or any other concerns.  Did bedside teaching with return precautions.  All questions answered.  The patient acknowledges understanding.  Gave verbal discharge instructions.            Scribe Attestation:   Scribe #1: I performed the above scribed service and the documentation accurately describes the services I performed. I attest to the accuracy of the note.               Clinical Impression:   The primary encounter diagnosis was Nausea. A diagnosis of Shortness of breath was also pertinent to this visit.      Disposition:   Disposition: Discharged  Condition: Stable                        Kayode Phan MD  01/06/19 1342

## 2019-01-29 ENCOUNTER — OFFICE VISIT (OUTPATIENT)
Dept: NEUROLOGY | Facility: CLINIC | Age: 49
End: 2019-01-29
Payer: MEDICAID

## 2019-01-29 ENCOUNTER — RESEARCH ENCOUNTER (OUTPATIENT)
Dept: NEUROLOGY | Facility: CLINIC | Age: 49
End: 2019-01-29

## 2019-01-29 VITALS
HEART RATE: 77 BPM | DIASTOLIC BLOOD PRESSURE: 98 MMHG | SYSTOLIC BLOOD PRESSURE: 155 MMHG | WEIGHT: 217 LBS | HEIGHT: 67 IN | BODY MASS INDEX: 34.06 KG/M2

## 2019-01-29 DIAGNOSIS — I10 ESSENTIAL HYPERTENSION: ICD-10-CM

## 2019-01-29 DIAGNOSIS — Z86.73 HISTORY OF EMBOLIC STROKE: ICD-10-CM

## 2019-01-29 DIAGNOSIS — I63.411 EMBOLIC STROKE INVOLVING RIGHT MIDDLE CEREBRAL ARTERY: Primary | ICD-10-CM

## 2019-01-29 DIAGNOSIS — I34.89: ICD-10-CM

## 2019-01-29 DIAGNOSIS — E78.5 HYPERLIPIDEMIA, UNSPECIFIED HYPERLIPIDEMIA TYPE: ICD-10-CM

## 2019-01-29 PROCEDURE — 99214 OFFICE O/P EST MOD 30 MIN: CPT | Mod: PBBFAC | Performed by: PSYCHIATRY & NEUROLOGY

## 2019-01-29 PROCEDURE — 99215 PR OFFICE/OUTPT VISIT, EST, LEVL V, 40-54 MIN: ICD-10-PCS | Mod: S$PBB,,, | Performed by: PSYCHIATRY & NEUROLOGY

## 2019-01-29 PROCEDURE — 99215 OFFICE O/P EST HI 40 MIN: CPT | Mod: S$PBB,,, | Performed by: PSYCHIATRY & NEUROLOGY

## 2019-01-29 PROCEDURE — 99999 PR PBB SHADOW E&M-EST. PATIENT-LVL IV: ICD-10-PCS | Mod: PBBFAC,,, | Performed by: PSYCHIATRY & NEUROLOGY

## 2019-01-29 PROCEDURE — 99999 PR PBB SHADOW E&M-EST. PATIENT-LVL IV: CPT | Mod: PBBFAC,,, | Performed by: PSYCHIATRY & NEUROLOGY

## 2019-01-29 NOTE — PROGRESS NOTES
Vascular Neurology  Clinic Note    Reason For Visit (Chief Complaint): stroke    HPI: Ms. Ryan is a 48 y.o. woman with PMH HTN, HLD, admitted in December for R insular cortex stroke.  She is here for follow-up after hospital discharge.    Briefly, Ms. Ryan was in her USOH when her mother noticed L facial droop and slurred speech.  She did not come to the ED until several days later.  MRI showed R insular cortex stroke (embolic appearing), no significant vessel stenosis.  She was started on DAPT and atorvastatin.  TTE showed valvular strands on mitral valve (Lambl's Excresence?).      Since discharge Ms. Ryan has been doing well.  She has returned to work.  She is compliant with medications but has not seen her PCP since the hospitalization (has one at [facility]).  She reports eating healthy food -- baked fish and salad.  She has been wearing the cardiac event monitor, but has been having issues with the battery, even after another one was sent to her.      Brain Imaging:  MRI/MRA 12/21/18:  1. Acute infarction of the right insular cortex without significant mass effect or midline shift.  No evidence of hemorrhagic conversion.  2. No high-grade arterial stenosis or occlusion.  3. Atherosclerotic plaque of the bilateral carotid bifurcations with less than 50% stenosis.  4. Chronic microvascular ischemic change.      Cardiac Evaluation:  TTE 12/21/18:  LVEF 55%  Valvular strands noted on the tips of mitral valve,   likely degenerative chordae but would correlate clinically   for endocarditis in the setting of recent stroke.    Relevant Labwork:  Recent Labs   Lab 12/20/18  2159 12/20/18  2301   HEMOGLOBIN A1C 5.5  --    LDL CHOLESTEROL  --  224.8 H   HDL  --  50   TRIGLYCERIDES  --  91   CHOLESTEROL  --  293 H       I independently viewed the above imaging studies and  I reviewed the reports of the above imaging.  I reviewed the above labwork.    Review of Systems  Msk: negative for muscle pain  Skin:  "negative for pruritis  Neuro: negative for headache  All others negative    Past Medical History  Past Medical History:   Diagnosis Date    Hypertension     Stroke      Family History    Social History  Does laundry for work.  Non-smoker.  No EtOH.  Lives alone.  Mom lives down the street.        Medication List with Changes/Refills   Current Medications    AMLODIPINE (NORVASC) 10 MG TABLET    Take 10 mg by mouth once daily.    ASPIRIN (ECOTRIN) 81 MG EC TABLET    Take 1 tablet (81 mg total) by mouth once daily.    ATORVASTATIN (LIPITOR) 80 MG TABLET    Take 1 tablet (80 mg total) by mouth once daily.    CLOPIDOGREL (PLAVIX) 75 MG TABLET    Take 1 tablet (75 mg total) by mouth once daily.    ONDANSETRON (ZOFRAN-ODT) 4 MG TBDL    Take 1 tablet (4 mg total) by mouth every 8 (eight) hours as needed.       EXAM  Vital Signs  Pulse: 77  BP: (!) 155/98  Pain Score: 0-No pain  Height and Weight  Height: 5' 7" (170.2 cm)  Weight: 98.4 kg (217 lb)  BSA (Calculated - sq m): 2.16 sq meters  BMI (Calculated): 34.1  Weight in (lb) to have BMI = 25: 159.3]    General: well appearing without discomfort   Neck: no carotid bruits  CV: RRR, nL S1&S2  Resp: breathing comfortably, no wheezing  Ext: wwp, no pedal edema    Mental Status: Alert and oriented, normal attention, speech fluent and prosodic, naming and repetition intact, follows multistep embedded commands, no e/o neglect or extinction  Cranial Nerves: PERRL, EOMI, VFF, sensation intact, very mild R facial, TUP midline, SCM/trap 5/5  Motor: Normal bulk and tone, no drift, finger taps symmetric  Strength 5/5 throughout  Sensory: intact light touch bilaterally, intact proprioception bilaterally  Coordination: no ataxia on finger-to-nose  Gait & Stance: normal  DTR: 2+ symmetric    NIHSS = 1 (facial)  ___________________  ASSESSMENT & PLAN  Ms. Ryan is a 48 y.o. woman with PMH HTN, HLD, admitted in December for R insular cortex stroke, likely embolic.  Unclear if she has " underlying afib, or if valvular strands on MV contributed to thrombus formation.  Limited evidence for anticoagulation in this setting, however would consider it if patient had a recurrent event.  Patient also to be considered for JESSICA trial, which would include randomization to apixaban or placebo.  For now will treat with aspirin and continue ESUS workup with cardiac event monitor.    - Continue aspirin 81 for secondary stroke prevention.  OK to stop Plavix (completed 30d).  - Continue atorvastatin 80 for HLD.  - Will refer to Cardiology for consideration for loop record and evaluation of valvular strands.  - Continue to monitor BP, and bring recordings to followup with PCP.  - Discussed importance of Mediterranean Diet and exercise for stroke prevention.  - RTC 3 mos.    Problem List Items Addressed This Visit        Unprioritized    Embolic stroke involving right middle cerebral artery - Primary    Hypertension    History of embolic stroke    Relevant Orders    Ambulatory referral to Cardiology    Kranthi's excrescence of mitral valve      Other Visit Diagnoses     Hyperlipidemia, unspecified hyperlipidemia type              Sunni Contreras MD  Vascular Neurology

## 2019-01-29 NOTE — PATIENT INSTRUCTIONS
- Stop taking Clopidogrel (Plavix).  Continue on aspirin 81 mg forever.    - Continue atorvastatin for cholesterol.  - Write down your blood pressure and bring the readings to your primary care doctor.  - See Cardiology to discuss echocardiogram results and implantable heart monitor.        Mediterranean Diet Recommendations  · Eat primarily plant-based foods, such as fruits and vegetables, whole grains, legumes (beans) and nuts.  · Limit refined carbohydrates (white pasta, bread, rice).  · Replace butter with healthy fats such as olive oil.  · Use herbs and spices instead of salt to flavor foods.  · Limit red meat and processed meats to no more than a few times a month.  · Avoid sugary sodas, bakery goods, and sweets.  · Eat fish and poultry at least twice a week.  · Get plenty of exercise (150 minutes per week).    Adopted from Anyi rocha al, NEJM, 2018.

## 2019-01-29 NOTE — PROGRESS NOTES
JESSICA Study Consent Visit    47 y/o AAF with R MCA ESUS Dec 18, 2018.  Risks/benefits of screening for JESSICA Study (apixaban vs ASA in ESUS pts with atrial cardiopathy) discussed with the patient in the presence of her mother. All questions answered and written informed consent obtained.  Screening bloodwork sent to central lab.    Lance Blank MD

## 2019-02-03 PROBLEM — Z86.73 HISTORY OF EMBOLIC STROKE: Status: ACTIVE | Noted: 2019-02-03

## 2019-02-03 PROBLEM — I34.89: Status: ACTIVE | Noted: 2019-02-03

## 2019-02-05 NOTE — PROGRESS NOTES
Subject was seen in clinic today and consented for the following study:     Study title: Jeana  IRB #: 2017.588  Sponsor: [facility]  Sponsor's Protocol # [id number]     Informed Consent Process  Present for discussion: Subject, subject's mother, PI and    Is ABDIFATAH Consenting for Subject: No     Prior to the Informed Consent (IC) being signed, or any protocol required testing, procedure, or intervention being performed, the following was done or discussed:  · Purpose of the Study, Qualifications to Participate: Yes   · Study Design, Schedule and Procedures: Yes  · Risks, Benefits, Alternative Treatments, Compensation and Costs: Yes   · Confidentiality and HIPAA Authorization for Release of Medical Records for the research trial/subject's right/study related injury: Yes   · Study related contact information: Yes   · Voluntary Participation and Withdrawal from the research trial at any time: Yes   · Optional samples/procedures (if applicable): Yes   · Patient has been offered the opportunity to ask questions regarding the study and all questions were answered satisfactorily: Yes   · Patient verbalizes understanding of the study/procedures and agrees to participate: Yes   · CRC and PI contact information given to patient: Yes   · Signed copy given to patient: Yes   · Copy in patient's chart and original uploaded to Epic: Yes      Person Obtaining Consent: Kalani Childers       Subject did not meet atrial cardiopathy criteria, and will not be randomized to study drug

## 2019-12-31 RX ORDER — ATORVASTATIN CALCIUM 80 MG/1
TABLET, FILM COATED ORAL
Qty: 90 TABLET | Refills: 3 | Status: SHIPPED | OUTPATIENT
Start: 2019-12-31 | End: 2021-02-01

## 2021-08-18 NOTE — PLAN OF CARE
Problem: Occupational Therapy Goal  Goal: Occupational Therapy Goal  Outcome: Outcome(s) achieved Date Met: 12/21/18  Pt with no skilled OT needs identified upon evaluation. Rec Home with no OT needs. Safe to be up with nursing supervision for ADLs/self care/mobility at household distances. QUINCY Andrade 12/21/2018        [Initial Evaluation] : an initial evaluation [Family Member] : family member [FreeTextEntry1] : Diarrhea/constipation